# Patient Record
Sex: FEMALE | Race: BLACK OR AFRICAN AMERICAN | URBAN - METROPOLITAN AREA
[De-identification: names, ages, dates, MRNs, and addresses within clinical notes are randomized per-mention and may not be internally consistent; named-entity substitution may affect disease eponyms.]

---

## 2021-05-02 ENCOUNTER — HOSPITAL ENCOUNTER (INPATIENT)
Age: 31
LOS: 3 days | Discharge: HOME | DRG: 754 | End: 2021-05-06
Attending: EMERGENCY MEDICINE | Admitting: PSYCHIATRY & NEUROLOGY
Payer: MEDICAID

## 2021-05-02 DIAGNOSIS — N39.0 URINARY TRACT INFECTION WITHOUT HEMATURIA, SITE UNSPECIFIED: ICD-10-CM

## 2021-05-02 DIAGNOSIS — F14.10 COCAINE USE DISORDER (HCC): ICD-10-CM

## 2021-05-02 DIAGNOSIS — F43.21 ADJUSTMENT DISORDER WITH DEPRESSED MOOD: ICD-10-CM

## 2021-05-02 DIAGNOSIS — F11.10: ICD-10-CM

## 2021-05-02 DIAGNOSIS — F19.10 POLYSUBSTANCE ABUSE (HCC): ICD-10-CM

## 2021-05-02 DIAGNOSIS — R45.851 SUICIDAL IDEATION: Primary | ICD-10-CM

## 2021-05-02 DIAGNOSIS — Z91.89 AT HIGH RISK FOR SELF HARM: ICD-10-CM

## 2021-05-02 LAB
ALBUMIN SERPL-MCNC: 3.3 G/DL (ref 3.5–5)
ALBUMIN/GLOB SERPL: 0.9 {RATIO} (ref 1.1–2.2)
ALP SERPL-CCNC: 87 U/L (ref 45–117)
ALT SERPL-CCNC: 33 U/L (ref 12–78)
AMPHET UR QL SCN: NEGATIVE
ANION GAP SERPL CALC-SCNC: 6 MMOL/L (ref 5–15)
APAP SERPL-MCNC: <2 UG/ML (ref 10–30)
APPEARANCE UR: CLEAR
AST SERPL-CCNC: 16 U/L (ref 15–37)
BACTERIA URNS QL MICRO: ABNORMAL /HPF
BARBITURATES UR QL SCN: NEGATIVE
BASOPHILS # BLD: 0.1 K/UL (ref 0–0.1)
BASOPHILS NFR BLD: 1 % (ref 0–1)
BENZODIAZ UR QL: NEGATIVE
BILIRUB SERPL-MCNC: 0.1 MG/DL (ref 0.2–1)
BILIRUB UR QL: NEGATIVE
BUN SERPL-MCNC: 15 MG/DL (ref 6–20)
BUN/CREAT SERPL: 19 (ref 12–20)
CALCIUM SERPL-MCNC: 8.4 MG/DL (ref 8.5–10.1)
CANNABINOIDS UR QL SCN: NEGATIVE
CHLORIDE SERPL-SCNC: 99 MMOL/L (ref 97–108)
CO2 SERPL-SCNC: 28 MMOL/L (ref 21–32)
COCAINE UR QL SCN: NEGATIVE
COLOR UR: ABNORMAL
CREAT SERPL-MCNC: 0.8 MG/DL (ref 0.55–1.02)
DIFFERENTIAL METHOD BLD: ABNORMAL
DRUG SCRN COMMENT,DRGCM: NORMAL
EOSINOPHIL # BLD: 0.4 K/UL (ref 0–0.4)
EOSINOPHIL NFR BLD: 4 % (ref 0–7)
EPITH CASTS URNS QL MICRO: ABNORMAL /LPF
ERYTHROCYTE [DISTWIDTH] IN BLOOD BY AUTOMATED COUNT: 11.9 % (ref 11.5–14.5)
ETHANOL SERPL-MCNC: <10 MG/DL
FLUAV RNA SPEC QL NAA+PROBE: NOT DETECTED
FLUBV RNA SPEC QL NAA+PROBE: NOT DETECTED
GLOBULIN SER CALC-MCNC: 3.7 G/DL (ref 2–4)
GLUCOSE SERPL-MCNC: 171 MG/DL (ref 65–100)
GLUCOSE UR STRIP.AUTO-MCNC: NEGATIVE MG/DL
HCG SERPL QL: NEGATIVE
HCG UR QL: NEGATIVE
HCT VFR BLD AUTO: 42.4 % (ref 35–47)
HGB BLD-MCNC: 14.4 G/DL (ref 11.5–16)
HGB UR QL STRIP: NEGATIVE
IMM GRANULOCYTES # BLD AUTO: 0 K/UL (ref 0–0.04)
IMM GRANULOCYTES NFR BLD AUTO: 0 % (ref 0–0.5)
KETONES UR QL STRIP.AUTO: NEGATIVE MG/DL
LEUKOCYTE ESTERASE UR QL STRIP.AUTO: ABNORMAL
LYMPHOCYTES # BLD: 3.8 K/UL (ref 0.8–3.5)
LYMPHOCYTES NFR BLD: 40 % (ref 12–49)
MCH RBC QN AUTO: 31.9 PG (ref 26–34)
MCHC RBC AUTO-ENTMCNC: 34 G/DL (ref 30–36.5)
MCV RBC AUTO: 93.8 FL (ref 80–99)
METHADONE UR QL: NEGATIVE
MONOCYTES # BLD: 0.7 K/UL (ref 0–1)
MONOCYTES NFR BLD: 7 % (ref 5–13)
NEUTS SEG # BLD: 4.5 K/UL (ref 1.8–8)
NEUTS SEG NFR BLD: 48 % (ref 32–75)
NITRITE UR QL STRIP.AUTO: NEGATIVE
NRBC # BLD: 0 K/UL (ref 0–0.01)
NRBC BLD-RTO: 0 PER 100 WBC
OPIATES UR QL: NEGATIVE
PCP UR QL: NEGATIVE
PH UR STRIP: 7.5 [PH] (ref 5–8)
PLATELET # BLD AUTO: 275 K/UL (ref 150–400)
PMV BLD AUTO: 9.9 FL (ref 8.9–12.9)
POTASSIUM SERPL-SCNC: 3.7 MMOL/L (ref 3.5–5.1)
PROT SERPL-MCNC: 7 G/DL (ref 6.4–8.2)
PROT UR STRIP-MCNC: NEGATIVE MG/DL
RBC # BLD AUTO: 4.52 M/UL (ref 3.8–5.2)
RBC #/AREA URNS HPF: ABNORMAL /HPF (ref 0–5)
SALICYLATES SERPL-MCNC: <1.7 MG/DL (ref 2.8–20)
SARS-COV-2, COV2: NOT DETECTED
SODIUM SERPL-SCNC: 133 MMOL/L (ref 136–145)
SP GR UR REFRACTOMETRY: 1 (ref 1–1.03)
UA: UC IF INDICATED,UAUC: ABNORMAL
UROBILINOGEN UR QL STRIP.AUTO: 1 EU/DL (ref 0.2–1)
WBC # BLD AUTO: 9.5 K/UL (ref 3.6–11)
WBC URNS QL MICRO: ABNORMAL /HPF (ref 0–4)

## 2021-05-02 PROCEDURE — 84703 CHORIONIC GONADOTROPIN ASSAY: CPT

## 2021-05-02 PROCEDURE — 81025 URINE PREGNANCY TEST: CPT

## 2021-05-02 PROCEDURE — 90791 PSYCH DIAGNOSTIC EVALUATION: CPT

## 2021-05-02 PROCEDURE — 81001 URINALYSIS AUTO W/SCOPE: CPT

## 2021-05-02 PROCEDURE — 80143 DRUG ASSAY ACETAMINOPHEN: CPT

## 2021-05-02 PROCEDURE — 93005 ELECTROCARDIOGRAM TRACING: CPT

## 2021-05-02 PROCEDURE — 80179 DRUG ASSAY SALICYLATE: CPT

## 2021-05-02 PROCEDURE — 80053 COMPREHEN METABOLIC PANEL: CPT

## 2021-05-02 PROCEDURE — 99285 EMERGENCY DEPT VISIT HI MDM: CPT

## 2021-05-02 PROCEDURE — 80307 DRUG TEST PRSMV CHEM ANLYZR: CPT

## 2021-05-02 PROCEDURE — 36415 COLL VENOUS BLD VENIPUNCTURE: CPT

## 2021-05-02 PROCEDURE — 85025 COMPLETE CBC W/AUTO DIFF WBC: CPT

## 2021-05-02 PROCEDURE — 82077 ASSAY SPEC XCP UR&BREATH IA: CPT

## 2021-05-02 PROCEDURE — 87636 SARSCOV2 & INF A&B AMP PRB: CPT

## 2021-05-02 NOTE — ED TRIAGE NOTES
Pt comes in with family (sister who drove all the way from [place] and aunt) reporting SI for a while and getting worse. Pt says she smoke crack and sniffs heroin and that she plans to start injecting and overdose. Pt denies HI. Pt says she is hearing voice saying, \"Do it! Do it! \"

## 2021-05-03 PROBLEM — F19.94 SUBSTANCE INDUCED MOOD DISORDER (HCC): Status: ACTIVE | Noted: 2021-05-03

## 2021-05-03 LAB
ATRIAL RATE: 66 BPM
CALCULATED P AXIS, ECG09: 67 DEGREES
CALCULATED R AXIS, ECG10: 70 DEGREES
CALCULATED T AXIS, ECG11: 44 DEGREES
DIAGNOSIS, 93000: NORMAL
P-R INTERVAL, ECG05: 184 MS
Q-T INTERVAL, ECG07: 410 MS
QRS DURATION, ECG06: 106 MS
QTC CALCULATION (BEZET), ECG08: 429 MS
VENTRICULAR RATE, ECG03: 66 BPM

## 2021-05-03 PROCEDURE — 65220000003 HC RM SEMIPRIVATE PSYCH

## 2021-05-03 PROCEDURE — 74011250637 HC RX REV CODE- 250/637: Performed by: EMERGENCY MEDICINE

## 2021-05-03 RX ORDER — BENZTROPINE MESYLATE 1 MG/1
1 TABLET ORAL
Status: DISCONTINUED | OUTPATIENT
Start: 2021-05-03 | End: 2021-05-06 | Stop reason: HOSPADM

## 2021-05-03 RX ORDER — ACETAMINOPHEN 325 MG/1
650 TABLET ORAL
Status: DISCONTINUED | OUTPATIENT
Start: 2021-05-03 | End: 2021-05-06 | Stop reason: HOSPADM

## 2021-05-03 RX ORDER — CLONIDINE HYDROCHLORIDE 0.1 MG/1
0.1 TABLET ORAL
Status: DISCONTINUED | OUTPATIENT
Start: 2021-05-03 | End: 2021-05-06 | Stop reason: HOSPADM

## 2021-05-03 RX ORDER — OLANZAPINE 5 MG/1
5 TABLET ORAL
Status: DISCONTINUED | OUTPATIENT
Start: 2021-05-03 | End: 2021-05-06 | Stop reason: HOSPADM

## 2021-05-03 RX ORDER — TRAZODONE HYDROCHLORIDE 50 MG/1
50 TABLET ORAL
Status: DISCONTINUED | OUTPATIENT
Start: 2021-05-03 | End: 2021-05-06 | Stop reason: HOSPADM

## 2021-05-03 RX ORDER — HYDROXYZINE 25 MG/1
50 TABLET, FILM COATED ORAL
Status: DISCONTINUED | OUTPATIENT
Start: 2021-05-03 | End: 2021-05-06 | Stop reason: HOSPADM

## 2021-05-03 RX ORDER — DIPHENHYDRAMINE HYDROCHLORIDE 50 MG/ML
50 INJECTION, SOLUTION INTRAMUSCULAR; INTRAVENOUS
Status: DISCONTINUED | OUTPATIENT
Start: 2021-05-03 | End: 2021-05-06 | Stop reason: HOSPADM

## 2021-05-03 RX ORDER — CEPHALEXIN 500 MG/1
500 CAPSULE ORAL
Status: COMPLETED | OUTPATIENT
Start: 2021-05-03 | End: 2021-05-03

## 2021-05-03 RX ORDER — CEPHALEXIN 500 MG/1
500 CAPSULE ORAL 4 TIMES DAILY
Status: DISCONTINUED | OUTPATIENT
Start: 2021-05-03 | End: 2021-05-03

## 2021-05-03 RX ORDER — LORAZEPAM 2 MG/ML
1 INJECTION INTRAMUSCULAR
Status: DISCONTINUED | OUTPATIENT
Start: 2021-05-03 | End: 2021-05-06 | Stop reason: HOSPADM

## 2021-05-03 RX ORDER — HALOPERIDOL 5 MG/ML
5 INJECTION INTRAMUSCULAR
Status: DISCONTINUED | OUTPATIENT
Start: 2021-05-03 | End: 2021-05-06 | Stop reason: HOSPADM

## 2021-05-03 RX ORDER — ADHESIVE BANDAGE
30 BANDAGE TOPICAL DAILY PRN
Status: DISCONTINUED | OUTPATIENT
Start: 2021-05-03 | End: 2021-05-06 | Stop reason: HOSPADM

## 2021-05-03 RX ADMIN — CEPHALEXIN 500 MG: 500 CAPSULE ORAL at 07:02

## 2021-05-03 RX ADMIN — CEPHALEXIN 500 MG: 500 CAPSULE ORAL at 00:38

## 2021-05-03 RX ADMIN — CEPHALEXIN 500 MG: 500 CAPSULE ORAL at 13:11

## 2021-05-03 RX ADMIN — CEPHALEXIN 500 MG: 500 CAPSULE ORAL at 09:56

## 2021-05-03 NOTE — ED NOTES
Pt left ED via wheelchair accompanied by security en route to [facility] room [facility]. Pt in no acute distress and verbalizes understanding of plan of care. Security given 1 pt belonging bag containing pt's belongings.

## 2021-05-03 NOTE — ED NOTES
Spoke with Maribel Lin from [facility] who reports that there are currently no behavioral health beds available; pt will be held over night. If pt decides to leave, a TDO may be necessary.

## 2021-05-03 NOTE — GROUP NOTE
EDBORAH  GROUP DOCUMENTATION INDIVIDUAL                                                                          Group Therapy Note    Date: 5/3/2021    Group Start Time: 1100  Group End Time: 1200  Group Topic: Topic Group    [facility] [place] [facility] ACUTE BEHAV [facility] [place]    [facility] [place] GROUP    Group Therapy Note    Attendees: 4         Attendance: Did not attend    Patient's Goal:      Interventions/techniques  Ric Loyola

## 2021-05-03 NOTE — ED NOTES
Ruben Crenshaw from [facility] reports that wer are waiting on bed within [facility] to become available for patient.

## 2021-05-03 NOTE — BSMART NOTE
Comprehensive Assessment Form Part 1    Section I - Disposition    Axis I - Substance Induced Mood Disorder, Cocaine Dependence, Opioid Dependence   Axis II - Deferred  Axis III - None  Axis IV - Relationship stressors, Lack of support system, Lack of structure  [place] V - 35      The Medical Doctor to Psychiatrist conference was not completed. The Medical Doctor is in agreement with Psychiatrist disposition because of (reason) patient is willing to be admitted voluntarily. The plan is admit to appropriate local psychiatric bed. Should patient change her mind while waiting for placement, a TDO evaluation should be requested from [facility]. The on-call Psychiatrist consulted was Dr. Hue Mar. The admitting Psychiatrist will be Dr. Hue Mar. The admitting Diagnosis is Substance Induced Mood Disorder. The Payor source is [employer]. Section II - Integrated Summary  Summary:  Patient is a 28year old female seen face to face in the ER. She was brought to the ER by her sister and aunt for suicidal ideation with a plan to overdose by injecting fentanyl. She reported she has been using crack cocaine for 9 years and became snorting heroin in this last year. She reported she is tired of using but feels like she can't control it. She is had not been sleeping. She reported she has had suicidal thoughts for a while but they have been getting worse. She reported some homicidal ideations towards her roommates with no plan. She denied generalized homicidal ideation. She reported she is hearing voices telling her to kill herself, saying \"do it, do it. \"  She said she has been hospitalized once before for suicidal thoughts, but she does not remember when this took place. She is not receiving any mental health or substance abuse treatment currently. She reported she knows she needs help and wants to be admitted psychiatrically. The patient has demonstrated mental capacity to provide informed consent.   The information is given by the patient and aunt. The Chief Complaint is suicidal.  The Precipitant Factors are relationship stressors, lack of support system, lack of structure, chronic substance abuse. Previous Hospitalizations: yes  The patient has not previously been in restraints. Current Psychiatrist and/or  is NA. Lethality Assessment:    The potential for suicide is noted by the following: defined plan, ideation and current substance abuse. The potential for homicide is not noted. The patient has not been a perpetrator of sexual or physical abuse. There are not pending charges. The patient is felt to be at risk for self harm or harm to others. The attending nurse was advised the patient needs supervision. Section III - Psychosocial  The patient's overall mood and attitude is withdrawn. Feelings of helplessness and hopelessness are observed by patient's self report. Generalized anxiety is not observed. Panic is not observed. Phobias are not observed. Obsessive compulsive tendencies are not observed. Section IV - Mental Status Exam  The patient's appearance shows no evidence of impairment. The patient's behavior shows no evidence of impairment. The patient is oriented to time, place, person and situation. The patient's speech is soft. The patient's mood is withdrawn. The range of affect is flat. The patient's thought content demonstrates no evidence of impairment. The thought process shows no evidence of impairment. The patient's perception demonstrated changes in the following:  auditory hallucinations. The patient's memory shows no evidence of impairment. The patient's appetite shows no evidence of impairment. The patient's sleep has evidence of insomnia. The patient's insight is blaming. The patient's judgement is psychologically impaired. Section V - Substance Abuse  The patient is using substances.   The patient is using tobacco by smoking for greater than 10 years with last use on today, alcohol for 5-10 years with last use on unknown, cannabis by smoking for 5-10 years with last use on last week, cocaine by inhalation for 5-10 years with last use on yesterday and heroin by inhalation for 1-5 years with last use on yesterday. The patient has experienced the following withdrawal symptoms: sweats, cravings and sleep disturbance. Section VI - Living Arrangements  The patient is single. The patient lives with roommates. The patient has no children. The patient does plan to return home upon discharge. The patient does not have legal issues pending. The patient's source of income comes from unknown. Anabaptist and cultural practices have not been voiced at this time. The patient's greatest support comes from her aunt and this person will be involved with the treatment. The patient has been in an event described as horrible or outside the realm of ordinary life experience either currently or in the past.  The patient has not been a victim of sexual/physical abuse. Section VII - Other Areas of Clinical Concern  The highest grade achieved is not assessed with the overall quality of school experience being described as unknown. The patient is currently unemployed and speaks [place] as a primary language. The patient has no communication impairments affecting communication. The patient's preference for learning can be described as: can read and write adequately.   The patient's hearing is normal.  The patient's vision is normal.      Heaven Fraire MA

## 2021-05-03 NOTE — BH NOTES
GROUP THERAPY PROGRESS NOTE    Patient did not participate in Coping Skills Group.     Rachelle Gr MSW

## 2021-05-03 NOTE — GROUP NOTE
DEBORAH  GROUP DOCUMENTATION INDIVIDUAL                                                                          Group Therapy Note    Date: 5/3/2021    Group Start Time: 1500  Group End Time: 1600  Group Topic: Recreational/Music Therapy    [facility] [place] [address] ACUTE BEHAV [facility]    [place] [facility] GROUP DOCUMENTATION GROUP    Group Therapy Note    Attendees: 6         Attendance: Did not attend    Patient's Goal:      Interventions/techniquesGladis Pickens

## 2021-05-03 NOTE — ED NOTES
Verbal shift change report given to Arely Rosario RN (oncoming nurse) by Shakira Campbell RN (offgoing nurse). Report included the following information SBAR, Kardex, ED Summary, Procedure Summary, MAR and Recent Results.

## 2021-05-03 NOTE — PROGRESS NOTES
Behavioral Services  Medicare Certification Upon Admission    I certify that this patient's inpatient psychiatric hospital admission is medically necessary for:    [x] (1) Treatment which could reasonably be expected to improve this patient's condition,       [x] (2) Or for diagnostic study;     AND     [x](2) The inpatient psychiatric services are provided while the individual is under the care of a physician and are included in the individualized plan of care.     Estimated length of stay/service 3-5 days    Plan for post-hospital care rehab    Electronically signed by Juancarlos Diaz MD on 5/3/2021 at 1:11 PM

## 2021-05-03 NOTE — ED NOTES
Pts asleep in bed at this time. Lights turned off and door closed w/ curtains moved back so pt is within site. . Will continue to monitor    Assumed patient care as the role of preceptor to Destiney Roberts RN.

## 2021-05-03 NOTE — ED NOTES
TRANSFER - OUT REPORT:    Verbal report given to SHERITA Jhaveri(name) on Shant Resendiz  being transferred to [facility] (unit) room 314 for routine progression of care       Report consisted of patients Situation, Background, Assessment and   Recommendations(SBAR). Information from the following report(s) SBAR, Kardex, ED Summary, Procedure Summary, MAR and Recent Results was reviewed with the receiving nurse. Lines:       Opportunity for questions and clarification was provided.       Patient transported with:   [employer]

## 2021-05-03 NOTE — ED PROVIDER NOTES
EMERGENCY DEPARTMENT HISTORY AND PHYSICAL EXAM      Please note that this dictation was completed with the assistance of \"Dragon\", the computer voice recognition software. Quite often unanticipated grammatical, syntax, homophones, and other interpretive errors are inadvertently transcribed by the computer software. Please disregard these errors and any errors that have escaped final proofreading. Thank you. Patient Name: Aris Long  : 1990  MRN: [id number]  History of Presenting Illness     Chief Complaint   Patient presents with    Suicidal     History Provided By: Patient and sister    HPI: Aris Long, 28 y.o. female with past medical history as documented below presents to the ED with c/o of suicide attempt and suicide thoughts. According to patient and patient's sister, patient has been expressing increasing suicide thoughts and depressive symptoms for the past week or so. Patient states that earlier today she was doing a bunch of drugs including cocaine and heroin in attempt to end her life. She called her sister who stated that the patient voiced that she wanted some fentanyl to end it all. Patient voiced previous admissions about 2 months ago and another institution for admission for psychiatric stabilization. Patient states that in the past she tried to cut her wrist and attempt to end her life. She currently takes no medications for her psychiatric illnesses. She reports a history of depression. She denies any medical complaints currently. She denies any sick contacts or exposure to COVID-19. She denies any recent alcohol use. She denies any homicidal ideations. She does voice having auditory hallucinations and states that the voices tell her to end her life. Denies any visual hallucinations. Pt denies any other alleviating or exacerbating factors.  Additionally, pt specifically denies any recent fever, chills, headache, nausea, vomiting, abdominal pain, CP, SOB, lightheadedness, dizziness, numbness, weakness, lower extremity swelling, heart palpitations, urinary sxs, diarrhea, constipation, melena, hematochezia, cough, or congestion. There are no other complaints, changes or physical findings pertinent to the HPI at this time. PCP: None    Past History   Past Medical History:  Depression  Suicide attempts    Past Surgical History:  Denies    Family History:  Denies    Social History:  Endorses tobacco use, occasional ETOH, endorses cocaine and heroin use    Allergies: Allergies   Allergen Reactions    Seafood Swelling       Current Medications:  No current facility-administered medications on file prior to encounter. No current outpatient medications on file prior to encounter. Review of Systems   Review of Systems   Constitutional: Negative. Negative for chills and fever. HENT: Negative. Negative for congestion and sore throat. Eyes: Negative. Respiratory: Negative. Negative for cough, chest tightness, shortness of breath and wheezing. Cardiovascular: Negative. Negative for chest pain, palpitations and leg swelling. Gastrointestinal: Negative. Negative for abdominal distention, abdominal pain, blood in stool, constipation, diarrhea, nausea and vomiting. Endocrine: Negative. Genitourinary: Negative. Negative for dysuria, flank pain, frequency, hematuria and urgency. Musculoskeletal: Negative. Negative for arthralgias, back pain and myalgias. Skin: Negative. Negative for color change and rash. Neurological: Negative. Negative for dizziness, syncope, speech difficulty, weakness, light-headedness, numbness and headaches. Hematological: Negative. Psychiatric/Behavioral: Positive for agitation, behavioral problems, decreased concentration, dysphoric mood, hallucinations, self-injury, sleep disturbance and suicidal ideas. Negative for confusion. The patient is not nervous/anxious.     All other systems reviewed and are negative. Physical Exam   Physical Exam  Vitals signs and nursing note reviewed. Constitutional:       General: She is not in acute distress. Appearance: She is well-developed. She is not diaphoretic. HENT:      Head: Normocephalic and atraumatic. Mouth/Throat:      Pharynx: No oropharyngeal exudate. Eyes:      Conjunctiva/sclera: Conjunctivae normal.   Neck:      Musculoskeletal: Normal range of motion. Cardiovascular:      Rate and Rhythm: Normal rate and regular rhythm. Heart sounds: Normal heart sounds. Pulmonary:      Effort: Pulmonary effort is normal. No respiratory distress. Breath sounds: Normal breath sounds. No wheezing or rales. Chest:      Chest wall: No tenderness. Abdominal:      General: Bowel sounds are normal. There is no distension. Palpations: Abdomen is soft. There is no mass. Tenderness: There is no abdominal tenderness. There is no guarding or rebound. Musculoskeletal: Normal range of motion. Skin:     General: Skin is warm. Neurological:      Mental Status: She is alert and oriented to person, place, and time. Cranial Nerves: No cranial nerve deficit. Motor: No abnormal muscle tone. Psychiatric:         Attention and Perception: She is attentive. She perceives auditory hallucinations. She does not perceive visual hallucinations. Mood and Affect: Mood is depressed. Affect is flat and tearful. Speech: Speech is delayed. Behavior: Behavior is slowed and withdrawn. Thought Content: Thought content is not paranoid or delusional. Thought content includes suicidal ideation. Thought content does not include homicidal ideation. Thought content includes suicidal plan. Thought content does not include homicidal plan. Judgment: Judgment is impulsive. Diagnostic Study Results     Labs -   I have personally reviewed and interpreted all available laboratory results.    Recent Results (from the past 24 hour(s))   CBC WITH AUTOMATED DIFF    Collection Time: 05/02/21  8:47 PM   Result Value Ref Range    WBC 9.5 3.6 - 11.0 K/uL    RBC 4.52 3.80 - 5.20 M/uL    HGB 14.4 11.5 - 16.0 g/dL    HCT 42.4 35.0 - 47.0 %    MCV 93.8 80.0 - 99.0 FL    MCH 31.9 26.0 - 34.0 PG    MCHC 34.0 30.0 - 36.5 g/dL    RDW 11.9 11.5 - 14.5 %    PLATELET 203 234 - 033 K/uL    MPV 9.9 8.9 - 12.9 FL    NRBC 0.0 0  WBC    ABSOLUTE NRBC 0.00 0.00 - 0.01 K/uL    NEUTROPHILS 48 32 - 75 %    LYMPHOCYTES 40 12 - 49 %    MONOCYTES 7 5 - 13 %    EOSINOPHILS 4 0 - 7 %    BASOPHILS 1 0 - 1 %    IMMATURE GRANULOCYTES 0 0.0 - 0.5 %    ABS. NEUTROPHILS 4.5 1.8 - 8.0 K/UL    ABS. LYMPHOCYTES 3.8 (H) 0.8 - 3.5 K/UL    ABS. MONOCYTES 0.7 0.0 - 1.0 K/UL    ABS. EOSINOPHILS 0.4 0.0 - 0.4 K/UL    ABS. BASOPHILS 0.1 0.0 - 0.1 K/UL    ABS. IMM. GRANS. 0.0 0.00 - 0.04 K/UL    DF AUTOMATED     METABOLIC PANEL, COMPREHENSIVE    Collection Time: 05/02/21  8:47 PM   Result Value Ref Range    Sodium 133 (L) 136 - 145 mmol/L    Potassium 3.7 3.5 - 5.1 mmol/L    Chloride 99 97 - 108 mmol/L    CO2 28 21 - 32 mmol/L    Anion gap 6 5 - 15 mmol/L    Glucose 171 (H) 65 - 100 mg/dL    BUN 15 6 - 20 MG/DL    Creatinine 0.80 0.55 - 1.02 MG/DL    BUN/Creatinine ratio 19 12 - 20      GFR est AA >60 >60 ml/min/1.73m2    GFR est non-AA >60 >60 ml/min/1.73m2    Calcium 8.4 (L) 8.5 - 10.1 MG/DL    Bilirubin, total 0.1 (L) 0.2 - 1.0 MG/DL    ALT (SGPT) 33 12 - 78 U/L    AST (SGOT) 16 15 - 37 U/L    Alk.  phosphatase 87 45 - 117 U/L    Protein, total 7.0 6.4 - 8.2 g/dL    Albumin 3.3 (L) 3.5 - 5.0 g/dL    Globulin 3.7 2.0 - 4.0 g/dL    A-G Ratio 0.9 (L) 1.1 - 2.2     ETHYL ALCOHOL    Collection Time: 05/02/21  8:47 PM   Result Value Ref Range    ALCOHOL(ETHYL),SERUM <44 <01 MG/DL   SALICYLATE    Collection Time: 05/02/21  8:47 PM   Result Value Ref Range    Salicylate level <7.0 (L) 2.8 - 20.0 MG/DL   ACETAMINOPHEN    Collection Time: 05/02/21  8:47 PM   Result Value Ref Range    Acetaminophen level <2 (L) 10 - 30 ug/mL   HCG QL SERUM    Collection Time: 05/02/21  8:47 PM   Result Value Ref Range    HCG, Ql. Negative NEG     COVID-19 WITH INFLUENZA A/B    Collection Time: 05/02/21  8:50 PM   Result Value Ref Range    SARS-CoV-2 Not detected NOTD      Influenza A by PCR Not detected NOTD      Influenza B by PCR Not detected NOTD     EKG, 12 LEAD, INITIAL    Collection Time: 05/02/21  9:42 PM   Result Value Ref Range    Ventricular Rate 66 BPM    Atrial Rate 66 BPM    P-R Interval 184 ms    QRS Duration 106 ms    Q-T Interval 410 ms    QTC Calculation (Bezet) 429 ms    Calculated P Axis 67 degrees    Calculated R Axis 70 degrees    Calculated T Axis 44 degrees    Diagnosis       Normal sinus rhythm with sinus arrhythmia  Normal ECG  No previous ECGs available     DRUG SCREEN, URINE    Collection Time: 05/02/21 10:46 PM   Result Value Ref Range    AMPHETAMINES Negative NEG      BARBITURATES Negative NEG      BENZODIAZEPINES Negative NEG      COCAINE Negative NEG      METHADONE Negative NEG      OPIATES Negative NEG      PCP(PHENCYCLIDINE) Negative NEG      THC (TH-CANNABINOL) Negative NEG      Drug screen comment (NOTE)    URINALYSIS W/ REFLEX CULTURE    Collection Time: 05/02/21 10:46 PM    Specimen: Urine   Result Value Ref Range    Color YELLOW/STRAW      Appearance CLEAR CLEAR      Specific gravity 1.005 1.003 - 1.030      pH (UA) 7.5 5.0 - 8.0      Protein Negative NEG mg/dL    Glucose Negative NEG mg/dL    Ketone Negative NEG mg/dL    Bilirubin Negative NEG      Blood Negative NEG      Urobilinogen 1.0 0.2 - 1.0 EU/dL    Nitrites Negative NEG      Leukocyte Esterase SMALL (A) NEG      WBC 5-10 0 - 4 /hpf    RBC 0-5 0 - 5 /hpf    Epithelial cells FEW FEW /lpf    Bacteria 1+ (A) NEG /hpf    UA:UC IF INDICATED CULTURE NOT INDICATED BY UA RESULT CNI     HCG URINE, QL. - POC    Collection Time: 05/02/21 10:59 PM   Result Value Ref Range    Pregnancy test,urine (POC) Negative NEG Radiologic Studies -   I have personally reviewed and interpreted all available imaging studies and agree with radiology interpretation and report. No orders to display     CT Results  (Last 48 hours)    None        CXR Results  (Last 48 hours)    None          Medical Decision Making   I reviewed the vital signs, available nursing notes, past medical history, past surgical history, family history and social history. Vital Signs-Reviewed the patient's vital signs. Patient Vitals for the past 24 hrs:   Temp Pulse Resp BP SpO2   05/03/21 0302 97.7 °F (36.5 °C) 93 16 117/70 99 %   05/02/21 1957 98.1 °F (36.7 °C) 90 20 124/85 99 %       Pulse Oximetry Analysis - 99% on RA    Cardiac Monitor:   Rate: 90 bpm  The cardiac monitor revealed the following rhythm as interpreted by me: Normal Sinus Rhythm       Records Reviewed: Nursing Notes, Old Medical Records, Previous electrocardiograms, Previous Radiology Studies and Previous Laboratory Studies    Provider Notes (Medical Decision Making):   Patient presents with acute suicidal ideation. DDx:  2/2 MDD, schizoaffective d/o, bipolar, drug induced, organic cause such as electrolyte anomoly or infection. Stable vitals and benign exam. No obvious organic causes to explain behavior but will obtain psych labs, UA, UDS and speak with mental health professional about possible admission. Pt is currently voluntary. Sitter at bedside. Will continue to monitor while in ED. ED Course:   I am the first provider for this patient's ED visit today. Initial assessment performed. I discussed presenting problems, concerns and my formulated plan for today's visit with the patient and any available family members at bedside. I encouraged them to ask questions as they arise throughout the visit. TOBACCO COUNSELING:  Upon evaluation, pt expressed that they are a current tobacco user.  For approximately 5 mins, pt has been counseled on the dangers of smoking and was encouraged to quit as soon as possible in order to decrease further risks to their health. Pt has conveyed their understanding of the risks involved should they continue to use tobacco products. Drug Abuse Cessation: Assessment and Intervention  Patient was assessed for drug abuse and addiction using the DAST-10 screening tool and determined to be moderate risk. Discussed the risks of drug abuse and the long term sequelae of cocaine and heroin use with the patient such as increased risk of depression, respiratory failure, heart attack, stroke, and death. Patient was offered follow-up resources on local rehabilitation facilities. Patient declined the resources. The patient verbalized their understanding. . Counseled patient for approximately 17 minutes (between 15-30 minutes). I reviewed our electronic medical record system for any past medical records that were available that may contribute to the patient's current condition, the nursing notes and vital signs from today's visit.       ED Orders Placed :  Orders Placed This Encounter    CBC WITH AUTOMATED DIFF    METABOLIC PANEL, COMPREHENSIVE    BLOOD ALCOHOL (Ethyl Alcohol)    SALICYLATE    ACETAMINOPHEN    DRUG SCREEN, URINE    URINALYSIS W/ REFLEX CULTURE    HCG QL SERUM    COVID-19 WITH INFLUENZA A/B    DIET REGULAR    POC URINE PREGNANCY TEST    ENCOURAGE PO FLUIDS    HCG URINE, QL. - POC    EKG 12 LEAD INITIAL    cephALEXin (KEFLEX) capsule 500 mg    IP CONSULT TO BSMART       ED Medications Administered:  Medications   OLANZapine (ZyPREXA) tablet 5 mg (has no administration in time range)   haloperidol lactate (HALDOL) injection 5 mg (has no administration in time range)   benztropine (COGENTIN) tablet 1 mg (has no administration in time range)   diphenhydrAMINE (BENADRYL) injection 50 mg (has no administration in time range)   hydrOXYzine HCL (ATARAX) tablet 50 mg (has no administration in time range)   LORazepam (ATIVAN) injection 1 mg (has no administration in time range)   traZODone (DESYREL) tablet 50 mg (has no administration in time range)   acetaminophen (TYLENOL) tablet 650 mg (has no administration in time range)   magnesium hydroxide (MILK OF MAGNESIA) 400 mg/5 mL oral suspension 30 mL (has no administration in time range)   cloNIDine HCL (CATAPRES) tablet 0.1 mg (has no administration in time range)   cephALEXin (KEFLEX) capsule 500 mg (500 mg Oral Given 5/3/21 0038)     ED Course as of May 04 0242   Mon May 03, 2021   0950 Patient has been admitted to inpatient psychiatry here at [facility].    [MS]      ED Course User Index  [MS] Carol Davila MD      Consult Note:  Shanel Nuñez MD spoke with [facility],   Specialty: [facility]  Discussed pt's hx, disposition, and available diagnostic and imaging results. Reviewed care plans. Agree with management and plan thus far. Consultant will evaluate pt for admission. Progress Note:  Pt is medically cleared. Awaiting BSMART disposition. Disposition:   ADMIT  Given the patient's current clinical presentation, I have a high level of concern for decompensation if discharged from the emergency department. Patient is being admitted to the hospital.  The results of their tests and reasons for their admission have been discussed with them and/or available family. They convey agreement and understanding for the need to be admitted and for their admission diagnosis. Consultation has been made with the inpatient physician specialist for hospitalization. Diagnosis   Clinical Impression:  1. Suicidal ideation    2. Polysubstance abuse ([address]    3. At high risk for self harm    4. Urinary tract infection without hematuria, site unspecified        Attestation:  Dominique Machado MD, am the attending of record for this patient. I personally performed the services described in this documentation on this date, 5/2/2021 for patient, Fadia Mayo.  I have reviewed the chart and verified that the record is accurate and complete.

## 2021-05-03 NOTE — ED NOTES
Pt arrives with Mom with c/o SI for \"a while\" but has recently gotten worse. Pt reports SI, reports thoughts of harming people who live in her home, reports auditory hallucinations of voices saying, \"just kill yourself. Just do it. You're worthless. \" Pt reports her plan for harming herself is to shoot up with fentanyl. Pt denies visual hallucinations. Pt reports daily use of crack cocaine and heroin, and frequent use of ETOH. Pt and mom both endorse no substance use or ETOH use today. Pt reports she did both crack and heroin yesterday. Pt mom reports pt was \"missing\" for 6 years, pt mom spent 6 years trying to locate her daughter and states she has called [employer] continuously to inquire about any sightings of her daughter and finally located her for the first time last month at a shelter in [address] [place] [address]. Alert and oriented x 4. Skin warm, dry, intact. Ambulates independently. Mother at bedside. Plan of care discussed. Emergency Department Nursing Plan of Care       The Nursing Plan of Care is developed from the Nursing assessment and Emergency Department Attending provider initial evaluation. The plan of care may be reviewed in the ED Provider note.     The Plan of Care was developed with the following considerations:   Patient / Family readiness to learn indicated by:verbalized understanding  Persons(s) to be included in education: patient  Barriers to Learning/Limitations:No    Signed     Tarah Ward RN    5/2/2021   8:28 PM

## 2021-05-03 NOTE — GROUP NOTE
[facility] GROUP DOCUMENTATION INDIVIDUAL                                                                          Group Therapy Note    Date: 5/3/2021    Group Start Time: 0900  Group End Time: 1000  Group Topic: Topic Group    [facility] [place] [address] ACUTE BEHAV [facility]    [address] GROUP    Group Therapy Note    Attendees: 5         Attendance: Did not attend    Patient's Goal:      Interventions/techniques:  Stefany Nuñez

## 2021-05-03 NOTE — BSMART NOTE
Patient has been accepted to Bed 314/2 at [facility] by Dr Marissa Williamson. Report can be called to [phone].

## 2021-05-03 NOTE — ED NOTES
Bedside and Verbal shift change report given to Cian Valentin RN (oncoming nurse) by Isadora Olsen RN (offgoing nurse). Report included the following information SBAR, Kardex, ED Summary, [facility] and Recent Results.

## 2021-05-03 NOTE — BH NOTES
Pt admitted today SI with a plan to overdose on fentanyl. Per report has a hx of using crack, cocaine and heroin. Pt states she was having Command A/H to hurt herself. Pt is receiving no out patient  Treatment at this time. Pt states she is homicidal to roommates. Per report pt was given keflex for UTI in er. Allergy to seafood. Pt is calm and cooperative on admission to unit. appears anxious and depressed. Pt denies si/hi/a/v roland. pt states she was SI before coming into the hospital. Pt states her sister encourged her to come in for help. 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 UDS and BAL negative. Pt also states its lots of people running in and out of her home. She is upset about it. Pt appears to be a poor historian. Skin is intact. old scars. Pt given snack and is resting in room.

## 2021-05-03 NOTE — BH NOTES
GROUP THERAPY PROGRESS NOTE    Patient did not participate in Coping Skills group.      Natan Owens MSW

## 2021-05-04 PROBLEM — F14.10 COCAINE USE DISORDER (HCC): Status: ACTIVE | Noted: 2021-05-04

## 2021-05-04 PROBLEM — F11.10: Status: ACTIVE | Noted: 2021-05-04

## 2021-05-04 PROBLEM — F19.94 SUBSTANCE INDUCED MOOD DISORDER (HCC): Status: RESOLVED | Noted: 2021-05-03 | Resolved: 2021-05-04

## 2021-05-04 PROBLEM — F43.21 ADJUSTMENT DISORDER WITH DEPRESSED MOOD: Status: ACTIVE | Noted: 2021-05-04

## 2021-05-04 PROCEDURE — 74011250637 HC RX REV CODE- 250/637: Performed by: PSYCHIATRY & NEUROLOGY

## 2021-05-04 PROCEDURE — 65220000003 HC RM SEMIPRIVATE PSYCH

## 2021-05-04 PROCEDURE — 99223 1ST HOSP IP/OBS HIGH 75: CPT | Performed by: PSYCHIATRY & NEUROLOGY

## 2021-05-04 RX ORDER — TOPIRAMATE 25 MG/1
25 TABLET ORAL 2 TIMES DAILY WITH MEALS
Status: DISCONTINUED | OUTPATIENT
Start: 2021-05-04 | End: 2021-05-06 | Stop reason: HOSPADM

## 2021-05-04 RX ADMIN — TOPIRAMATE 25 MG: 25 TABLET, FILM COATED ORAL at 16:52

## 2021-05-04 RX ADMIN — HYDROXYZINE HYDROCHLORIDE 50 MG: 25 TABLET, FILM COATED ORAL at 15:47

## 2021-05-04 NOTE — BH NOTES
Met with patient one on one. She reports that she has been struggling with drug use (smoking crack cocaine and snorting and injecting heroin) for years and has not been sober since her early 20's. She shared that she has overdosed accidentally multiple times and decided to get help because she was having thoughts of overdosing on purposes because she felt stuck and depressed by her situation. She shared more inform about her situation including finally getting housing and letting drug users stay with her and them taking over. 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 She shared that she wants to be sober and that her uncle just was released from longterm/snf and will be staying with her and she changed the locks which has her feeling safer. She shared that she is not feeling well and described her symptoms. She reports not knowing what withdrawal was but that she has never gone this long without using and is having strong feelings and thoughts to use, restless, anxious feeling, and runny nose. She was educated on talking to her treatment team about her symptoms and her nurse. She was reminded she has medication available that can help with anxiety and may also have other medications for withdrawal. She reports going \"cold-turkey\" with her nicotine habit and that she can not wait to be discharged on Thursday when she can go smoke. She also shared that her only coping skill since admission was to sleep through the symptoms but now that she is awake she is having difficulty. She reports her sister is supportive of her and has been reassuring her and will be there to help her upon discharge. She was not feeling well and continued to rock while laying on her side in bed but did talk to this writer and was pleasant. Discussed with primary nurse that patient was having withdrawal symptoms and patient was wondering if there was anything to help her settle down.      Adolph Butterfield LPC LSATP [facility]

## 2021-05-04 NOTE — GROUP NOTE
[facility]  GROUP DOCUMENTATION INDIVIDUAL                                                                          Group Therapy Note    Date: 5/4/2021    Group Start Time: 1100  Group End Time: 1200  Group Topic: Topic Group    [address] ACUTE BEHAV [facility]    [place] [place] [facility] GROUP    Group Therapy Note    Attendees: 4         Attendance: Did not attend    Patient's Goal:      Interventions/techniques  Chris Ma

## 2021-05-04 NOTE — BH NOTES
PSYCHOSOCIAL ASSESSMENT  :Patient identifying info: Mike Arguelles is a 28 y.o., female admitted 5/2/2021  7:52 PM     Presenting problem and precipitating factors: Pt was admitted on a voluntary basis after aunt and sister brought her to ED for suicidal ideation with plan to overdose by injecting fentanyl. Pt report she has been using crack cocaine for 9 years, snorting heroin for a year and paying for this by \"tricking. \" Tiana Agustin telling her to kill herself. Mental status assessment: Pt was seen in treatment team this morning. Pt is alert and oriented. Pt denies SI/HI. Pt's mood is anxious, affect is WNL. Pt's thought process is coherent. Pt's insight and judgment is impaired, reliability is fair. Social work department will continue to coordinate discharge plans. Strengths: family support and seeking help     Collateral information: SisterMora Hicks     Current psychiatric /substance abuse providers and contact info: None    Previous psychiatric/substance abuse providers and response to treatment: One previous hospitalization at unknown time for SI. Long history of substance abuse. Family history of mental illness or substance abuse: None reported. Substance abuse history:  UDS: Negative; BAL=0  Social History     Tobacco Use    Smoking status: Current Every Day Smoker    Smokeless tobacco: Never Used   Substance Use Topics    Alcohol use: Yes     Comment: daily       History of biomedical complications associated with substance abuse: shakes, sweating    Patient's current acceptance of treatment or motivation for change:  Fair    Family constellation: Single and without children. Is significant other involved?  N/A    Describe support system: Sisters and mom     Describe living arrangements and home environment: Pt reports living in an apartment with a roommate    Health issues:   Hospital Problems  Never Reviewed          Codes Class Noted POA    Substance induced mood disorder [facility]) ICD-10-CM: Q12.05  ICD-9-CM: 292.84  5/3/2021 Unknown              Trauma history: None shared. Legal issues: Denied. History of  service: Denied. Financial status: None. Lutheran/cultural factors: None expressed at this time. Education/work history: 11th grade - report she is unemployed. Have you been licensed as a health care professional (current or ): No    Leisure and recreation preferences: Unknown at this time.      Describe coping skills: Poor and ineffectual.     Obdulia Reyes  2021

## 2021-05-04 NOTE — BH NOTES
GROUP THERAPY PROGRESS NOTE    Patient is participating in a Coping Skills Group     Group time: 45 minutes     Personal goal for participation: To discuss the impact of negative self-talk on mental health and practice positive affirmations to improve mood and view of self    Goal orientation: Personal    Group therapy participation: passive    Therapeutic interventions reviewed and discussed: Patient discussed the impact of negative self-talk on mental health. Patients discussed and wrote down self-defeating statements they make to themselves.  provided psychoeducation on reframing negative-self talk into positive affirmations. Patients completed exercise where they reframed their self-defeating statements into positive affirmations. Patients listened to soothing music and used watercolors to [employer] on and paint a positive affirmation they desire to speak over themselves. Impression of participation: Pt present in dayroom eating her dinner. Pt declined to verbally participate or complete water color activity. Pt sat away from SW and other patients. Pt had depressed mood, flat affect, simple speech.    MAIKOL Brothers

## 2021-05-04 NOTE — PROGRESS NOTES
Assumed care of patient after receiving shift report from outgoing nurse. Patient was in bed resting. Pt was cooperative during assessment. A&O x 4. Affect was sad, mood was anxious and depressed. Hygiene is adequate, independent in ADLs. Gait is steady. Sleep and appetite patterns reported WNL. Denies AVH/SI/HI. Endorses a \"little\" Anxiety/Depression. Last BM was 05/03/21. Pt denies pain. Pt is being monitored with COWs for substance use. Pt encouraged to continue to participate in care. Pt has been observed throughout the night. Total hours slept approximately 11. No s/s of distress noted. Patient has remained safe. Cow scores 0 throughout night.

## 2021-05-04 NOTE — BH NOTES
GROUP THERAPY PROGRESS NOTE    Patient did not participate in Coping Skills group.      Gala Adorno LPC LSATP [facility]

## 2021-05-04 NOTE — GROUP NOTE
DEBORAH  GROUP DOCUMENTATION INDIVIDUAL                                                                          Group Therapy Note    Date: 5/4/2021    Group Start Time: 1500  Group End Time: 1600  Group Topic: Recreational/Music Therapy    [facility] [address] ACUTE BEHAV [facility]    [place], [address] [facility] GROUP DOCUMENTATION GROUP    Group Therapy Note    Attendees: 6         Attendance: Did not attend    Patient's Goal:      Interventions/techniques:Gladis Pickens

## 2021-05-04 NOTE — H&P
INITIAL PSYCHIATRIC EVALUATION            IDENTIFICATION:    Patient Name  Arabella Goins   Date of Birth 1990   [facility] [id number]   Medical Record Number  [id number]      Age  28 y.o. PCP None   Admit date:  5/2/2021    Room Number  111/29  @ [facility]   Date of Service  5/4/2021            HISTORY         REASON FOR HOSPITALIZATION:  CC: \"suicidal ideation / detox\". Pt admitted under a voluntary basis for suicidal ideations proving to be an imminent danger to self and an inability to care for self. HISTORY OF PRESENT ILLNESS:    The patient, Arabella Goins, is a 28 y.o. BLACK/ female with a past psychiatric history significant for cocaine and methamphetamine use disorder, who presents at this time with complaints of (and/or evidence of) the following emotional symptoms: depression and suicidal thoughts/threats. Additional symptomatology include escalation of substance abuse. The above symptoms have been present for 2+ weeks. These symptoms are of moderate to high severity. These symptoms are constant in nature. The patient's condition has been precipitated by psychosocial stressors. Patient's condition made worse by continued illicit drug use. UDS: negative; BAL=0. The patient is a fair historian. The patient corroborates the above narrative. The patient contracts for safety on the unit and gives consent for the team to contact collateral. The patient is amenable to initiating treatment while on the unit. The patient reports that she has been feeling fairly depressed and using cocaine and heroin. She felt overwhelmed and stated she was considering injecting cocaine into her vein to end her own life. She is ambivalent about substance abuse rehab, but agrees to detox services. Patient requests STI testing. ALLERGIES:   Allergies   Allergen Reactions    Seafood Swelling      MEDICATIONS PRIOR TO ADMISSION:   No medications prior to admission.       PAST MEDICAL HISTORY:   History reviewed. No pertinent past medical history. History reviewed. No pertinent surgical history. SOCIAL HISTORY:  The patient is currently working as a ; the patient is a smoker, and smokes up to  1 ppd; the patient's marital status is single; the patient does not have children; the patient reports the highest level of education achieved is 11th grade. FAMILY HISTORY: History reviewed, pertinent family history as below:   History reviewed. No pertinent family history. REVIEW OF SYSTEMS:   Pertinent items are noted in the History of Present Illness. All other Systems reviewed and are considered negative. MENTAL STATUS EXAM & VITALS     MENTAL STATUS EXAM (MSE):    MSE FINDINGS ARE WITHIN NORMAL LIMITS (WNL) UNLESS OTHERWISE STATED BELOW. ( ALL OF THE BELOW CATEGORIES OF THE MSE HAVE BEEN REVIEWED (reviewed 5/4/2021) AND UPDATED AS DEEMED APPROPRIATE )  General Presentation age appropriate, cooperative and guarded   Orientation oriented to time, place and person   Vital Signs  See below (reviewed 5/4/2021); Vital Signs (BP, Pulse, & Temp) are within normal limits if not listed below.    Gait and Station Stable/steady, no ataxia   Musculoskeletal System No extrapyramidal symptoms (EPS); no abnormal muscular movements or Tardive Dyskinesia (TD); muscle strength and tone are within normal limits   Language No aphasia or dysarthria   Speech:  normal volume   Thought Processes logical; normal rate of thoughts; poor abstract reasoning/computation   Thought Associations goal directed   Thought Content preoccupations   Suicidal Ideations contracts for safety   Homicidal Ideations none   Mood:  depressed and anhedonia   Affect:  constricted and mood-congruent   Memory recent  intact   Memory remote:  intact   Concentration/Attention:  intact   Fund of Knowledge average   Insight:  limited   Reliability fair   Judgment:  poor          VITALS:     Patient Vitals for the past 24 hrs:   Temp Pulse Resp BP SpO2   05/04/21 0838 97.8 °F (36.6 °C) 74 18 112/66 99 %   05/03/21 2020 98.2 °F (36.8 °C) 72 18 111/70 98 %   05/03/21 1600 97.8 °F (36.6 °C) 78 18 100/67 100 %     Wt Readings from Last 3 Encounters:   05/03/21 52.1 kg (114 lb 12.8 oz)     Temp Readings from Last 3 Encounters:   05/04/21 97.8 °F (36.6 °C)     BP Readings from Last 3 Encounters:   05/04/21 112/66     Pulse Readings from Last 3 Encounters:   05/04/21 74            DATA     LABORATORY DATA:  Labs Reviewed   CBC WITH AUTOMATED DIFF - Abnormal; Notable for the following components:       Result Value    ABS.  LYMPHOCYTES 3.8 (*)     All other components within normal limits   METABOLIC PANEL, COMPREHENSIVE - Abnormal; Notable for the following components:    Sodium 133 (*)     Glucose 171 (*)     Calcium 8.4 (*)     Bilirubin, total 0.1 (*)     Albumin 3.3 (*)     A-G Ratio 0.9 (*)     All other components within normal limits   SALICYLATE - Abnormal; Notable for the following components:    Salicylate level <2.4 (*)     All other components within normal limits   ACETAMINOPHEN - Abnormal; Notable for the following components:    Acetaminophen level <2 (*)     All other components within normal limits   URINALYSIS W/ REFLEX CULTURE - Abnormal; Notable for the following components:    Leukocyte Esterase SMALL (*)     Bacteria 1+ (*)     All other components within normal limits   ETHYL ALCOHOL   DRUG SCREEN, URINE   HCG QL SERUM   COVID-19 WITH INFLUENZA A/B   CT/NG/T.VAGINALIS AMPLIFICATION   HCG URINE, QL. - POC     Admission on 05/02/2021   Component Date Value Ref Range Status    WBC 05/02/2021 9.5  3.6 - 11.0 K/uL Final    RBC 05/02/2021 4.52  3.80 - 5.20 M/uL Final    HGB 05/02/2021 14.4  11.5 - 16.0 g/dL Final    HCT 05/02/2021 42.4  35.0 - 47.0 % Final    MCV 05/02/2021 93.8  80.0 - 99.0 FL Final    MCH 05/02/2021 31.9  26.0 - 34.0 PG Final    MCHC 05/02/2021 34.0  30.0 - 36.5 g/dL Final    RDW 05/02/2021 11.9  11.5 - 14.5 % Final    PLATELET 60/20/7329 774  150 - 400 K/uL Final    MPV 05/02/2021 9.9  8.9 - 12.9 FL Final    NRBC 05/02/2021 0.0  0  WBC Final    ABSOLUTE NRBC 05/02/2021 0.00  0.00 - 0.01 K/uL Final    NEUTROPHILS 05/02/2021 48  32 - 75 % Final    LYMPHOCYTES 05/02/2021 40  12 - 49 % Final    MONOCYTES 05/02/2021 7  5 - 13 % Final    EOSINOPHILS 05/02/2021 4  0 - 7 % Final    BASOPHILS 05/02/2021 1  0 - 1 % Final    IMMATURE GRANULOCYTES 05/02/2021 0  0.0 - 0.5 % Final    ABS. NEUTROPHILS 05/02/2021 4.5  1.8 - 8.0 K/UL Final    ABS. LYMPHOCYTES 05/02/2021 3.8* 0.8 - 3.5 K/UL Final    ABS. MONOCYTES 05/02/2021 0.7  0.0 - 1.0 K/UL Final    ABS. EOSINOPHILS 05/02/2021 0.4  0.0 - 0.4 K/UL Final    ABS. BASOPHILS 05/02/2021 0.1  0.0 - 0.1 K/UL Final    ABS. IMM. GRANS. 05/02/2021 0.0  0.00 - 0.04 K/UL Final    DF 05/02/2021 AUTOMATED    Final    Sodium 05/02/2021 133* 136 - 145 mmol/L Final    Potassium 05/02/2021 3.7  3.5 - 5.1 mmol/L Final    Chloride 05/02/2021 99  97 - 108 mmol/L Final    CO2 05/02/2021 28  21 - 32 mmol/L Final    Anion gap 05/02/2021 6  5 - 15 mmol/L Final    Glucose 05/02/2021 171* 65 - 100 mg/dL Final    BUN 05/02/2021 15  6 - 20 MG/DL Final    Creatinine 05/02/2021 0.80  0.55 - 1.02 MG/DL Final    BUN/Creatinine ratio 05/02/2021 19  12 - 20   Final    GFR est AA 05/02/2021 >60  >60 ml/min/1.73m2 Final    GFR est non-AA 05/02/2021 >60  >60 ml/min/1.73m2 Final    Calcium 05/02/2021 8.4* 8.5 - 10.1 MG/DL Final    Bilirubin, total 05/02/2021 0.1* 0.2 - 1.0 MG/DL Final    ALT (SGPT) 05/02/2021 33  12 - 78 U/L Final    AST (SGOT) 05/02/2021 16  15 - 37 U/L Final    Alk.  phosphatase 05/02/2021 87  45 - 117 U/L Final    Protein, total 05/02/2021 7.0  6.4 - 8.2 g/dL Final    Albumin 05/02/2021 3.3* 3.5 - 5.0 g/dL Final    Globulin 05/02/2021 3.7  2.0 - 4.0 g/dL Final    A-G Ratio 05/02/2021 0.9* 1.1 - 2.2   Final    ALCOHOL(ETHYL),SERUM 05/02/2021 <10  <10 MG/DL Final    Salicylate level 40/08/1999 <1.7* 2.8 - 20.0 MG/DL Final    Acetaminophen level 05/02/2021 <2* 10 - 30 ug/mL Final    AMPHETAMINES 05/02/2021 Negative  NEG   Final    BARBITURATES 05/02/2021 Negative  NEG   Final    BENZODIAZEPINES 05/02/2021 Negative  NEG   Final    COCAINE 05/02/2021 Negative  NEG   Final    METHADONE 05/02/2021 Negative  NEG   Final    OPIATES 05/02/2021 Negative  NEG   Final    PCP(PHENCYCLIDINE) 05/02/2021 Negative  NEG   Final    THC (TH-CANNABINOL) 05/02/2021 Negative  NEG   Final    Drug screen comment 05/02/2021 (NOTE)   Final    Color 05/02/2021 YELLOW/STRAW    Final    Appearance 05/02/2021 CLEAR  CLEAR   Final    Specific gravity 05/02/2021 1.005  1.003 - 1.030   Final    pH (UA) 05/02/2021 7.5  5.0 - 8.0   Final    Protein 05/02/2021 Negative  NEG mg/dL Final    Glucose 05/02/2021 Negative  NEG mg/dL Final    Ketone 05/02/2021 Negative  NEG mg/dL Final    Bilirubin 05/02/2021 Negative  NEG   Final    Blood 05/02/2021 Negative  NEG   Final    Urobilinogen 05/02/2021 1.0  0.2 - 1.0 EU/dL Final    Nitrites 05/02/2021 Negative  NEG   Final    Leukocyte Esterase 05/02/2021 SMALL* NEG   Final    WBC 05/02/2021 5-10  0 - 4 /hpf Final    RBC 05/02/2021 0-5  0 - 5 /hpf Final    Epithelial cells 05/02/2021 FEW  FEW /lpf Final    Bacteria 05/02/2021 1+* NEG /hpf Final    UA:UC IF INDICATED 05/02/2021 CULTURE NOT INDICATED BY UA RESULT  CNI   Final    HCG, Ql. 05/02/2021 Negative  NEG   Final    SARS-CoV-2 05/02/2021 Not detected  NOTD   Final    Influenza A by PCR 05/02/2021 Not detected  NOTD   Final    Influenza B by PCR 05/02/2021 Not detected  NOTD   Final    Ventricular Rate 05/02/2021 66  BPM Final    Atrial Rate 05/02/2021 66  BPM Final    P-R Interval 05/02/2021 184  ms Final    QRS Duration 05/02/2021 106  ms Final    Q-T Interval 05/02/2021 410  ms Final    QTC Calculation (Bezet) 05/02/2021 429  ms Final    Calculated P Axis 05/02/2021 67  degrees Final    Calculated R Axis 05/02/2021 70  degrees Final    Calculated T Axis 05/02/2021 44  degrees Final    Diagnosis 05/02/2021    Final                    Value:Normal sinus rhythm with sinus arrhythmia  Normal ECG  No previous ECGs available  Confirmed by Sergio John M.D., [facility] ([id number]) on 5/3/2021 7:51:32 AM      Pregnancy test,urine (POC) 05/02/2021 Negative  NEG   Final        RADIOLOGY REPORTS:  No results found for this or any previous visit. No results found. MEDICATIONS       ALL MEDICATIONS  Current Facility-Administered Medications   Medication Dose Route Frequency    topiramate (TOPAMAX) tablet 25 mg  25 mg Oral BID WITH MEALS    OLANZapine (ZyPREXA) tablet 5 mg  5 mg Oral Q6H PRN    haloperidol lactate (HALDOL) injection 5 mg  5 mg IntraMUSCular Q6H PRN    benztropine (COGENTIN) tablet 1 mg  1 mg Oral BID PRN    diphenhydrAMINE (BENADRYL) injection 50 mg  50 mg IntraMUSCular BID PRN    hydrOXYzine HCL (ATARAX) tablet 50 mg  50 mg Oral TID PRN    LORazepam (ATIVAN) injection 1 mg  1 mg IntraMUSCular Q4H PRN    traZODone (DESYREL) tablet 50 mg  50 mg Oral QHS PRN    acetaminophen (TYLENOL) tablet 650 mg  650 mg Oral Q4H PRN    magnesium hydroxide (MILK OF MAGNESIA) 400 mg/5 mL oral suspension 30 mL  30 mL Oral DAILY PRN    cloNIDine HCL (CATAPRES) tablet 0.1 mg  0.1 mg Oral Q4H PRN      SCHEDULED MEDICATIONS  Current Facility-Administered Medications   Medication Dose Route Frequency    topiramate (TOPAMAX) tablet 25 mg  25 mg Oral BID WITH MEALS                ASSESSMENT & PLAN        The patient, Ling Newton, is a 28 y.o.  female who presents at this time for treatment of the following diagnoses:  Patient Active Hospital Problem List:   Adjustment disorder with depressed mood    Assessment: suspect cocaine withdrawal dysphoria.  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 She is a good candidate for rehab services. Will start agent to control cravings, observe off substances. Plan:   - Observe off substances  - f/u HIV, RPR, GC/Chyl  - START Topamax 25 mg BID for cocaine use disorder  - IGM therapy as tolerated  - Expand database / obtain collateral  - Dispo planning (home vs rehab)           A coordinated, multidisplinary treatment team (includes the nurse, unit pharmacist,  and writer) round was conducted for this initial evaluation with the patient present. The following regarding medications was addressed during rounds with patient: the risks and benefits of the proposed medication. The patient was given the opportunity to ask questions. Informed consent given to the use of the above medications. I will continue to adjust psychiatric and non-psychiatric medications (see above \"medication\" section and orders section for details) as deemed appropriate & based upon diagnoses and response to treatment. I have reviewed admission (and previous/old) labs and medical tests in the EHR and or transferring hospital documents. I will continue to order blood tests/labs and diagnostic tests as deemed appropriate and review results as they become available (see orders for details). I have reviewed old psychiatric and medical records available in the EHR. I Will order additional psychiatric records from other institutions to further elucidate the nature of patient's psychopathology and review once available. I will gather additional collateral information from friends, family and o/p treatment team to further elucidate the nature of patient's psychopathology and baselline level of psychiatric functioning.     I certify that this patient's inpatient psychiatric hospital services are required for treatment that could reasonably be expected to improve the patient's condition, or for diagnostic study, and that the patient continues to need, on a daily basis, active treatment furnished directly by or requiring the supervision of inpatient psychiatric facility personnel. In addition, the hospital records show that services furnished were intensive treatment services, admission or related services, or equivalent services.       ESTIMATED LENGTH OF STAY:  3-5 days       STRENGTHS:  Exercising self-direction/Resourceful, Access to housing/residential stability and Awareness of Substance abuse issues                                        SIGNED:    Moraima Butler MD  5/4/2021

## 2021-05-04 NOTE — BH NOTES
GROUP THERAPY PROGRESS NOTE    Patient did not participate in Substance abuse/Coping Skills group.      Jorje Chand LPC [facility]

## 2021-05-04 NOTE — BH NOTES
0730 - Pt report was received from off going shift's RN.     0800 - Pt assessment was performed. Pt is alert and oriented x 4. Pt is currently resting in bed. She does not appear to be experiencing any withdrawal symptoms at this time. Pt denies SI/HI and denies hallucinations. 2025 - Vitals were obtained. 1550 - Pt received prn hydroxyzine 50 mg po for anxiety. 1600 - COW score 10. Vitals were obtained. Pulse 102; /69. R -18. Temp 98.6; O2 98%. 1700 - Pt is currently resting. She expressed that the prn medication provided relief with anxiety. Pt also reports that she is still experiencing cravings for \"crack\" but symptoms have decreased. 1900 - Pt remains in bed resting. She has been isolative to her room for most of the shift. Staff will continue to monitor pt's safety and needs.

## 2021-05-04 NOTE — PROGRESS NOTES
[facility] Admission Pharmacy Medication Reconciliation     Information obtained from: Patient interview, [facility]   RxQuery data available1:No    Comments/recommendations:  Declines taking prescription or over-the-counter medications    Medication changes (since last review): None    [facility] was accessed to determine fill history of any controlled medications. She has not filled any controlled medications within the past 2 years. 1RxQuery pharmacy benefit data reflects medications filled and processed through the patient's insurance, however                this data does NOT capture whether the medication was picked up or is currently being taken by the patient. Total Time Spent: 5 minutes    Past Medical History/Disease States:  History reviewed. No pertinent past medical history. Patient allergies:    Allergies as of 05/02/2021 - Review Complete 05/02/2021   Allergen Reaction Noted    Seafood Swelling 05/02/2021       Prior to admission medications:   None        Thank you,  RUBIO [facility] Specialist, [address] [place] [address] Nw: [phone] ([id number])  Pharmacy: [phone] ([id number])

## 2021-05-04 NOTE — GROUP NOTE
DEBORAH  GROUP DOCUMENTATION INDIVIDUAL                                                                          Group Therapy Note    Date: 5/4/2021    Group Start Time: 0900  Group End Time: 1000  Group Topic: Topic Group    [facility] ACUTE BEHAV [facility]    [place] [address] GROUP    Group Therapy Note    Attendees: 7         Attendance: Did not attend    Patient's Goal:      Interventions/techniques  Jero Rodriguez

## 2021-05-04 NOTE — PROGRESS NOTES
Problem: Suicide  Goal: *STG: Remains safe in hospital  Outcome: Progressing Towards Goal  Goal: *STG: Seeks staff when feelings of self harm or harm towards others arise  Outcome: Progressing Towards Goal  Goal: *STG/LTG: No longer expresses self destructive or suicidal thoughts  Outcome: Progressing Towards Goal     Problem: Depressed Mood (Adult/Pediatric)  Goal: *STG: Participates in treatment plan  Outcome: Progressing Towards Goal  Goal: *STG: Remains safe in hospital  Outcome: Progressing Towards Goal     Problem: Crack/Cocaine Withdrawal  Goal: *STG: Remains safe in hospital  Outcome: Progressing Towards Goal  Goal: *STG: Vital signs within defined limits  Outcome: Progressing Towards Goal     Problem: Anxiety-Behavioral Health (Adult/Pediatric)  Goal: *STG: Remains safe in hospital  Outcome: Progressing Towards Goal  Goal: *STG: Seeks staff when feelings of anxiety and fear arise  Outcome: Progressing Towards Goal

## 2021-05-05 LAB
HAV IGM SER QL: NONREACTIVE
HBV CORE IGM SER QL: NONREACTIVE
HBV SURFACE AG SER QL: <0.1 INDEX
HBV SURFACE AG SER QL: NEGATIVE
HCV AB SER IA-ACNC: >11 INDEX
HCV AB SERPL QL IA: REACTIVE
HCV COMMENT,HCGAC: ABNORMAL
HIV 1+2 AB+HIV1 P24 AG SERPL QL IA: NONREACTIVE
HIV12 RESULT COMMENT, HHIVC: NORMAL
RPR SER QL: NONREACTIVE
SP1: ABNORMAL
SP2: ABNORMAL
SP3: ABNORMAL

## 2021-05-05 PROCEDURE — 36415 COLL VENOUS BLD VENIPUNCTURE: CPT

## 2021-05-05 PROCEDURE — 80074 ACUTE HEPATITIS PANEL: CPT

## 2021-05-05 PROCEDURE — 87389 HIV-1 AG W/HIV-1&-2 AB AG IA: CPT

## 2021-05-05 PROCEDURE — 99232 SBSQ HOSP IP/OBS MODERATE 35: CPT | Performed by: PSYCHIATRY & NEUROLOGY

## 2021-05-05 PROCEDURE — 86592 SYPHILIS TEST NON-TREP QUAL: CPT

## 2021-05-05 PROCEDURE — 65220000003 HC RM SEMIPRIVATE PSYCH

## 2021-05-05 PROCEDURE — 86803 HEPATITIS C AB TEST: CPT

## 2021-05-05 PROCEDURE — 74011250637 HC RX REV CODE- 250/637: Performed by: PSYCHIATRY & NEUROLOGY

## 2021-05-05 PROCEDURE — 87661 TRICHOMONAS VAGINALIS AMPLIF: CPT

## 2021-05-05 RX ADMIN — HYDROXYZINE HYDROCHLORIDE 50 MG: 25 TABLET, FILM COATED ORAL at 21:27

## 2021-05-05 RX ADMIN — TOPIRAMATE 25 MG: 25 TABLET, FILM COATED ORAL at 08:30

## 2021-05-05 RX ADMIN — TRAZODONE HYDROCHLORIDE 50 MG: 50 TABLET ORAL at 21:27

## 2021-05-05 RX ADMIN — TOPIRAMATE 25 MG: 25 TABLET, FILM COATED ORAL at 17:30

## 2021-05-05 NOTE — BH NOTES
Behavioral Health Treatment Team Note       Patient goal(s) for today: Take medications as prescribed. Treatment team focus/goals: detox and dispo planning   Progress note: Pt was seen in treatment team this morning. Pt is alert and oriented. Pt denies SI/HI. Pt's mood is anxious, affect is constricted. Pt's thought process is coherent. Pt's insight and judgment is poor, reliability is fair. Experiencing withdrawal symptoms and  Social work department will continue to coordinate discharge plans. LOS:  2  Expected LOS: 5/6    Financial concerns/prescription coverage: [address]  Date of last family contact:  None at this time-  No CHRIS signed     Family requesting physician contact today:  No  Discharge plan: Home  Guns in the home: None involved. Outpatient provider(s): To be linked. She has a Community-based Care Manager, [address] [place] [address] Jeanie ([phone]). She also has addiction recovery services through her [employer] plan. Participating treatment team members:  MAIKOL Mar and Dr. Lucrecia Albarado MD

## 2021-05-05 NOTE — BH NOTES
GROUP THERAPY PROGRESS NOTE    Patient did not participate in Discharge Planning Group.      Rafaela Gonzalez LPC LSATP CSAC

## 2021-05-05 NOTE — GROUP NOTE
DEBORAH  GROUP DOCUMENTATION INDIVIDUAL Group Therapy Note Date: 5/5/2021 Group Start Time: 1400 Group End Time: 1500 Group Topic: Recreational/Music Therapy [facility] 3 ACUTE BEHAV [facility], [address] GROUP DOCUMENTATION GROUP Group Therapy Note Attendees: 5 Attendance: Did not attend Patient's Goal: Interventions/techniques

## 2021-05-05 NOTE — PROGRESS NOTES
The patient was in bed when staff arrived, but did get up to speak to her sister. Her sister did request to speak to the  today. The patient slept well through the night and had safety maintained. She was able to sleep a total of 10.5 hours. She was cooperative with staff and had several labs drawn and sent off this morning. No other issues or concerns.

## 2021-05-05 NOTE — PROGRESS NOTES
Problem: Suicide  Goal: *STG: Remains safe in hospital  Outcome: Progressing Towards Goal  Goal: *STG:  Verbalizes alternative ways of dealing with maladaptive feelings/behaviors  Outcome: Progressing Towards Goal  Goal: *STG/LTG: No longer expresses self destructive or suicidal thoughts  Outcome: Progressing Towards Goal     Problem: Depressed Mood (Adult/Pediatric)  Goal: *STG: Demonstrates reduction in symptoms and increase in insight into coping skills/future focused  Outcome: Progressing Towards Goal  Goal: *STG: Complies with medication therapy  Outcome: Progressing Towards Goal     0700: Shift change report given to Alina CRESPO (oncoming nurse) by Glenroy Lee (offgoing nurse). Report included the following information SBAR, Kardex, Intake/Output and Recent Results. 8231-3044: Assumed care of patient. Patient denies anxiety, depression, SI, HI, AVH and pain. Patient med and meal compliant. Patient sleeping in bed. 5165-9274: Patient in bed sleeping. No signs of distress noted at this moment. 9665-5315: Patient to dayroom to eat lunch. No voiced concerns. 4165-6093: Med and meal compliant. Patient had a phone call with sister. Patient upset due to sister stating that she is not ready to be discharged. Patient stated that it is not the sisters decision. Patient to bedroom to sleep.

## 2021-05-05 NOTE — GROUP NOTE
DEBORAH  GROUP DOCUMENTATION INDIVIDUAL Group Therapy Note Date: 5/5/2021 Group Start Time: 1000 Group End Time: 1100 Group Topic: Topic Group [facility] 3 ACUTE BEHAV [facility], [address] [facility] [place] [place] GROUP DOCUMENTATION GROUP Group Therapy Note Attendees: 5 Attendance: Did not attend Patient's Goal: Interventions/techniques Cyndee Merritt

## 2021-05-05 NOTE — PROGRESS NOTES
Problem: Suicide  Goal: *STG: Remains safe in hospital  Outcome: Progressing Towards Goal  Goal: *STG: Seeks staff when feelings of self harm or harm towards others arise  Outcome: Progressing Towards Goal  Goal: *STG: Attends activities and groups  Outcome: Not Progressing Towards Goal  Goal: *STG:  Verbalizes alternative ways of dealing with maladaptive feelings/behaviors  Outcome: Not Progressing Towards Goal  Goal: *STG/LTG: Complies with medication therapy  Outcome: Progressing Towards Goal  Goal: *STG/LTG: No longer expresses self destructive or suicidal thoughts  Outcome: Progressing Towards Goal  Goal: *LTG:  Identifies available community resources  Outcome: Not Progressing Towards Goal  Goal: *LTG:  Develops proactive suicide prevention plan  Outcome: Not Progressing Towards Goal  Goal: Interventions  Outcome: Not Progressing Towards Goal

## 2021-05-05 NOTE — BH NOTES
GROUP THERAPY PROGRESS NOTE    Patient did not participate in Coping Skills Group.      Janelle Bach LPC LSATP CSAC

## 2021-05-06 VITALS
RESPIRATION RATE: 14 BRPM | TEMPERATURE: 98.2 F | DIASTOLIC BLOOD PRESSURE: 61 MMHG | HEART RATE: 73 BPM | WEIGHT: 111 LBS | OXYGEN SATURATION: 100 % | SYSTOLIC BLOOD PRESSURE: 94 MMHG | BODY MASS INDEX: 20.96 KG/M2 | HEIGHT: 61 IN

## 2021-05-06 LAB
HBV CORE AB SERPL QL IA: NEGATIVE
HBV CORE IGM SERPL QL IA: NEGATIVE
HBV E AB SERPL QL IA: NEGATIVE
HBV E AG SERPL QL IA: NEGATIVE
HBV SURFACE AB SER QL: REACTIVE INDEX VALUE
HBV SURFACE AG SERPL QL IA: NEGATIVE
HCV AB S/CO SERPL IA: >11 S/CO RATIO (ref 0–0.9)
HCV AB S/CO SERPL IA: NORMAL

## 2021-05-06 PROCEDURE — 74011250637 HC RX REV CODE- 250/637: Performed by: PSYCHIATRY & NEUROLOGY

## 2021-05-06 PROCEDURE — 99239 HOSP IP/OBS DSCHRG MGMT >30: CPT | Performed by: PSYCHIATRY & NEUROLOGY

## 2021-05-06 RX ORDER — TRAZODONE HYDROCHLORIDE 50 MG/1
50 TABLET ORAL
Qty: 30 TAB | Refills: 1 | Status: SHIPPED | OUTPATIENT
Start: 2021-05-06

## 2021-05-06 RX ORDER — TOPIRAMATE 25 MG/1
25 TABLET ORAL 2 TIMES DAILY WITH MEALS
Qty: 60 TAB | Refills: 1 | Status: SHIPPED | OUTPATIENT
Start: 2021-05-06

## 2021-05-06 RX ORDER — HYDROXYZINE 50 MG/1
50 TABLET, FILM COATED ORAL
Qty: 60 TAB | Refills: 1 | Status: SHIPPED | OUTPATIENT
Start: 2021-05-06 | End: 2021-07-05

## 2021-05-06 RX ADMIN — TOPIRAMATE 25 MG: 25 TABLET, FILM COATED ORAL at 09:01

## 2021-05-06 NOTE — PROGRESS NOTES
The patient was in bed when staff arrived. She got out of bed to speak to her uncle on the phone as well as being assessed by staff. She reported no SI/HII/AVH, depression, or pain. She did report anxiety and cravings for illicit substances. She was given 50mg trazodone and 50mg atarax at 2127 for anxiety and sleep. The patient was able to sleep 10 hours and have safety maintained. No other issues or concerns.

## 2021-05-06 NOTE — BH NOTES
Behavioral Health Transition Record to Provider    Patient Name: Caterina Stewart  YOB: 1990  Medical Record Number: [id number]  Date of Admission: 5/2/2021  Date of Discharge: 5/6/2021    Attending Provider: Faith House, *  Discharging Provider: Zack Rivas MD  To contact this individual call [phone]  and ask the  to page. If unavailable, ask to be transferred to [facility] Provider on call. [facility] Provider will be available on call 24/7 and during holidays. Primary Care Provider: None    Allergies   Allergen Reactions    Seafood Swelling       Reason for Admission: Psychosis     Admission Diagnosis: Substance induced mood disorder ([facility] [F19.94]    * No surgery found *    Results for orders placed or performed during the hospital encounter of 05/02/21   CBC WITH AUTOMATED DIFF   Result Value Ref Range    WBC 9.5 3.6 - 11.0 K/uL    RBC 4.52 3.80 - 5.20 M/uL    HGB 14.4 11.5 - 16.0 g/dL    HCT 42.4 35.0 - 47.0 %    MCV 93.8 80.0 - 99.0 FL    MCH 31.9 26.0 - 34.0 PG    MCHC 34.0 30.0 - 36.5 g/dL    RDW 11.9 11.5 - 14.5 %    PLATELET 758 079 - 608 K/uL    MPV 9.9 8.9 - 12.9 FL    NRBC 0.0 0  WBC    ABSOLUTE NRBC 0.00 0.00 - 0.01 K/uL    NEUTROPHILS 48 32 - 75 %    LYMPHOCYTES 40 12 - 49 %    MONOCYTES 7 5 - 13 %    EOSINOPHILS 4 0 - 7 %    BASOPHILS 1 0 - 1 %    IMMATURE GRANULOCYTES 0 0.0 - 0.5 %    ABS. NEUTROPHILS 4.5 1.8 - 8.0 K/UL    ABS. LYMPHOCYTES 3.8 (H) 0.8 - 3.5 K/UL    ABS. MONOCYTES 0.7 0.0 - 1.0 K/UL    ABS. EOSINOPHILS 0.4 0.0 - 0.4 K/UL    ABS. BASOPHILS 0.1 0.0 - 0.1 K/UL    ABS. IMM.  GRANS. 0.0 0.00 - 0.04 K/UL    DF AUTOMATED     METABOLIC PANEL, COMPREHENSIVE   Result Value Ref Range    Sodium 133 (L) 136 - 145 mmol/L    Potassium 3.7 3.5 - 5.1 mmol/L    Chloride 99 97 - 108 mmol/L    CO2 28 21 - 32 mmol/L    Anion gap 6 5 - 15 mmol/L    Glucose 171 (H) 65 - 100 mg/dL    BUN 15 6 - 20 MG/DL    Creatinine 0.80 0.55 - 1.02 MG/DL    BUN/Creatinine ratio 19 12 - 20      GFR est AA >60 >60 ml/min/1.73m2    GFR est non-AA >60 >60 ml/min/1.73m2    Calcium 8.4 (L) 8.5 - 10.1 MG/DL    Bilirubin, total 0.1 (L) 0.2 - 1.0 MG/DL    ALT (SGPT) 33 12 - 78 U/L    AST (SGOT) 16 15 - 37 U/L    Alk.  phosphatase 87 45 - 117 U/L    Protein, total 7.0 6.4 - 8.2 g/dL    Albumin 3.3 (L) 3.5 - 5.0 g/dL    Globulin 3.7 2.0 - 4.0 g/dL    A-G Ratio 0.9 (L) 1.1 - 2.2     ETHYL ALCOHOL   Result Value Ref Range    ALCOHOL(ETHYL),SERUM <05 <76 MG/DL   SALICYLATE   Result Value Ref Range    Salicylate level <2.2 (L) 2.8 - 20.0 MG/DL   ACETAMINOPHEN   Result Value Ref Range    Acetaminophen level <2 (L) 10 - 30 ug/mL   DRUG SCREEN, URINE   Result Value Ref Range    AMPHETAMINES Negative NEG      BARBITURATES Negative NEG      BENZODIAZEPINES Negative NEG      COCAINE Negative NEG      METHADONE Negative NEG      OPIATES Negative NEG      PCP(PHENCYCLIDINE) Negative NEG      THC (TH-CANNABINOL) Negative NEG      Drug screen comment (NOTE)    URINALYSIS W/ REFLEX CULTURE    Specimen: Urine   Result Value Ref Range    Color YELLOW/STRAW      Appearance CLEAR CLEAR      Specific gravity 1.005 1.003 - 1.030      pH (UA) 7.5 5.0 - 8.0      Protein Negative NEG mg/dL    Glucose Negative NEG mg/dL    Ketone Negative NEG mg/dL    Bilirubin Negative NEG      Blood Negative NEG      Urobilinogen 1.0 0.2 - 1.0 EU/dL    Nitrites Negative NEG      Leukocyte Esterase SMALL (A) NEG      WBC 5-10 0 - 4 /hpf    RBC 0-5 0 - 5 /hpf    Epithelial cells FEW FEW /lpf    Bacteria 1+ (A) NEG /hpf    UA:UC IF INDICATED CULTURE NOT INDICATED BY UA RESULT CNI     HCG QL SERUM   Result Value Ref Range    HCG, Ql. Negative NEG     COVID-19 WITH INFLUENZA A/B   Result Value Ref Range    SARS-CoV-2 Not detected NOTD      Influenza A by PCR Not detected NOTD      Influenza B by PCR Not detected NOTD     HIV 1/2 AG/AB, 4TH GENERATION,W RFLX CONFIRM   Result Value Ref Range    HIV 1/2 Interpretation NONREACTIVE NR      HIV 1/2 result comment SEE NOTE     RPR   Result Value Ref Range    RPR NONREACTIVE NR     CHRONIC HEPATITIS PANEL   Result Value Ref Range    Hep B surface Ag screen Negative Negative      Hepatitis Be Antigen Negative Negative      Hep B Core Ab, IgM Negative Negative      Hep B Core Ab, total Negative Negative      Hepatitis Be Antibody PENDING     Anti-HBs Reactive Index Value    HCV Ab >11.0 (H) 0.0 - 0.9 s/co ratio   HEPATITIS PANEL, ACUTE   Result Value Ref Range    Hepatitis A, IgM NONREACTIVE NR      __          Hepatitis B surface Ag <0.10 Index    Hep B surface Ag Interp. Negative NEG      __          Hepatitis B core, IgM NONREACTIVE NR      __          Hepatitis C virus Ab >11.00 Index    Hep C virus Ab Interp. REACTIVE (A) NR      Hep C  virus Ab comment Method used is Siemens AdvZowPowaur     HCV COMMENT   Result Value Ref Range    COMMENT Comment     HCG URINE, QL. - POC   Result Value Ref Range    Pregnancy test,urine (POC) Negative NEG     EKG, 12 LEAD, INITIAL   Result Value Ref Range    Ventricular Rate 66 BPM    Atrial Rate 66 BPM    P-R Interval 184 ms    QRS Duration 106 ms    Q-T Interval 410 ms    QTC Calculation (Bezet) 429 ms    Calculated P Axis 67 degrees    Calculated R Axis 70 degrees    Calculated T Axis 44 degrees    Diagnosis       Normal sinus rhythm with sinus arrhythmia  Normal ECG  No previous ECGs available  Confirmed by Jayne Werner M.D., [facility] ([id number]) on 5/3/2021 7:51:32 AM         Immunizations administered during this encounter: There is no immunization history on file for this patient. Screening for Metabolic Disorders for Patients on Antipsychotic Medications  (Data obtained from the EMR)    Estimated Body Mass Index  Estimated body mass index is 20.97 kg/m² as calculated from the following:    Height as of this encounter: 5' 1\" (1.549 m). Weight as of this encounter: 50.3 kg (111 lb).      Vital Signs/Blood Pressure  Visit Vitals  BP 94/61 (BP Patient Position: Lying)   Pulse 73   Temp 98.2 °F (36.8 °C)   Resp 14   Ht 5' 1\" (1.549 m)   Wt 50.3 kg (111 lb)   LMP  (LMP Unknown)   SpO2 100%   Breastfeeding No   BMI 20.97 kg/m²       Blood Glucose/Hemoglobin A1c  Lab Results   Component Value Date/Time    Glucose 171 (H) 05/02/2021 08:47 PM       No results found for: HBA1C, HGBE8, GRA8JOCC     Lipid Panel  No results found for: CHOL, CHOLX, CHLST, CHOLV, 233461, HDL, HDLP, LDL, LDLC, DLDLP, TGLX, TRIGL, TRIGP, CHHD, CHHDX     Discharge Diagnosis: Please refer to physician's discharge summary. Discharge Plan:     The patient Regina Saldivar exhibits the ability to control behavior in a less restrictive environment. Patient's level of functioning is improving. No assaultive/destructive behavior has been observed for the past 24 hours. No suicidal/homicidal threat or behavior has been observed for the past 24 hours. There is no evidence of serious medication side effects. Patient has not been in physical or protective restraints for at least the past 24 hours. If weapons involved, how are they secured? None reported    Is patient aware of and in agreement with discharge plan? Yes    Arrangements for medication:  Prescriptions given to patient. Copy of discharge instructions to provider?:  [employer] [facility]    Arrangements for transportation home:  Family    Keep all follow up appointments as scheduled, continue to take prescribed medications per physician instructions. Mental health crisis number:  134 or your local mental health crisis line number at [phone]. Discharge Medication List and Instructions:   Current Discharge Medication List      START taking these medications    Details   topiramate (TOPAMAX) 25 mg tablet Take 1 Tab by mouth two (2) times daily (with meals).  Indications: cocaine use disorder  Qty: 60 Tab, Refills: 1      traZODone (DESYREL) 50 mg tablet Take 1 Tab by mouth nightly as needed for Sleep (For insomnia). Indications: insomnia associated with depression  Qty: 30 Tab, Refills: 1      hydrOXYzine HCL (ATARAX) 50 mg tablet Take 1 Tab by mouth two (2) times daily as needed for Anxiety for up to 60 days. Indications: anxious  Qty: 60 Tab, Refills: 1             Unresulted Labs (24h ago, onward)    None        To obtain results of studies pending at discharge, please contact N/A. Follow-up Information     Follow up With Specialties Details Why [id number]   Please call the triage specialist Zach Flanagan for a mental health intake assessment at [phone] ext. [phone] [address]  Hours: Monday-Friday 8:30AM- 12:00PM & 1:00PM-4:30PM   PHONE: [phone]: [phone]           Advanced Directive:   Does the patient have an appointed surrogate decision maker? Unknown   Does the patient have a Medical Advance Directive? Unknown   Does the patient have a Psychiatric Advance Directive? Unknown   If the patient does not have a surrogate or Medical Advance Directive AND Psychiatric Advance Directive, the patient was offered information on these advance directives. Unknown     Patient Instructions: Please continue all medications until otherwise directed by physician. Tobacco Cessation Discharge Plan:   Is the patient a smoker and needs referral for smoking cessation? No  Patient referred to the following for smoking cessation with an appointment? No   Patient was offered medication to assist with smoking cessation at discharge? No  Was education for smoking cessation added to the discharge instructions? No     Alcohol/Substance Abuse Discharge Plan:   Does the patient have a history of substance/alcohol abuse and requires a referral for treatment? Yes  Patient referred to the following for substance/alcohol abuse treatment with an appointment? Yes  Patient was offered medication to assist with alcohol cessation at discharge?  No  Was education for substance/alcohol abuse added to discharge instructions? Yes     Patient discharged to Home; provided to the patient/caregiver either in hard copy or electronically. Continuing care paperwork was faxed to community mental health providers.

## 2021-05-06 NOTE — GROUP NOTE
DEBORAH  GROUP DOCUMENTATION INDIVIDUAL Group Therapy Note Date: 5/6/2021 Group Start Time: 1000 Group End Time: 1100 Group Topic: Topic Group [address] ACUTE BEHAV [facility] [place] [address] [facility] GROUP DOCUMENTATION GROUP Group Therapy Note Attendees: 5 Attendance: Did not attend Patient's Goal:Gladis Mayorga

## 2021-05-06 NOTE — BH NOTES
GROUP THERAPY PROGRESS NOTE    Patient is participating in Process group. Group time: 35 minutes    Personal goal for participation: To try new coping skills    Goal orientation: Personal    Group therapy participation: passive    Therapeutic interventions reviewed and discussed: Group participating in a guided imagery activity designed for depression. Group members were asked to try to relax and follow along with the CD. Discussion of how each person felt during and after exercise. Group members also discussed alternative coping skills that they use currently and those that have not worked in the past.    Impression of participation: [facility] was present for the first part of group. She sat quietly working on her safety plan and then got up and excused herself to use the restroom. She did not return before group ended.     Higinio Alvarez LPC LSATP CSAC

## 2021-05-06 NOTE — BH NOTES
GROUP THERAPY PROGRESS NOTE    Patient did not participate in Substance Abuse/Coping Skills group.      Janelle Bach LPC LSATP CSAC

## 2021-05-06 NOTE — SUICIDE SAFETY PLAN
SAFETY PLAN    A suicide Safety Plan is a document that supports someone when they are having thoughts of suicide. Warning Signs that indicate a suicidal crisis may be developing: What (situations, thoughts, feelings, body sensations, behaviors, etc.) do you experience that lets you know you are beginning to think about suicide? 1. Talking to my self  2. Walking in circles  3. Using illicit substances     Internal Coping Strategies:  What things can I do (relaxation techniques, hobbies, physical activities, etc.) to take my mind off my problems without contacting another person? 1. Television  2. Sleeping  3. Jogging    People and social settings that provide distraction: Who can I call or where can I go to distract me? 1. Name: Dale Muñoz - sister  2. Name: Yvon Chaudhary whom I can ask for help: Who can I call when I need help - for example, friends, family, clergy, someone else? 1. Name: Kong Mar or [address] [place] [address] I can contact during a crisis: Who can I call for help - for example, my doctor, my psychiatrist, my psychologist, a mental health provider, a suicide hotline? [address] [place] [address]   Address: [address] [place] [address]  Phone: [phone]  Fax: [phone]  Crisis number:  [phone]     5. Suicide Prevention Lifeline: 6-869-952-TALK (2101)    4. [address] [place] [address] Emergency Services -  for example, [facility] suicide hotline, [facility] Hotline: [address] [place] [address]   Address: [address] [place] [address], [address] [place] [address] Road  Phone: [phone]  Fax: [phone]  Crisis number:  [phone]     Making the environment safe: How can I make my environment (house/apartment/living space) safer? For example, can I remove guns, medications, and other items? 1. Take medications as prescribed.

## 2021-05-06 NOTE — BH NOTES
PSYCHIATRIC PROGRESS NOTE         Patient Name  Andra Sam   Date of Birth 1990   [facility] [id number]   Medical Record Number  [id number]      Age  28 y.o. PCP None   Admit date:  5/2/2021    Room Number  713/11  @ [facility]   Date of Service  5/5/2021         E & M PROGRESS NOTE:         HISTORY       CC:  \"suicidal ideation\"  HISTORY OF PRESENT ILLNESS/INTERVAL HISTORY:  (reviewed/updated 5/5/2021). per initial evaluation: The patient, Andra Sam, is a 28 y.o. BLACK/ female with a past psychiatric history significant for cocaine and methamphetamine use disorder, who presents at this time with complaints of (and/or evidence of) the following emotional symptoms: depression and suicidal thoughts/threats. Additional symptomatology include escalation of substance abuse. The above symptoms have been present for 2+ weeks. These symptoms are of moderate to high severity. These symptoms are constant in nature. The patient's condition has been precipitated by psychosocial stressors. Patient's condition made worse by continued illicit drug use. UDS: negative; BAL=0.      The patient is a fair historian. The patient corroborates the above narrative. The patient contracts for safety on the unit and gives consent for the team to contact collateral. The patient is amenable to initiating treatment while on the unit. The patient reports that she has been feeling fairly depressed and using cocaine and heroin. She felt overwhelmed and stated she was considering injecting cocaine into her vein to end her own life. She is ambivalent about substance abuse rehab, but agrees to detox services. Patient requests STI testing.    5/05 - no acute overnight events. Patient slept 11 hours. She got labwork drawn, slept much of the day and overnight. Patient isolative, odd, medication compliant, no withdrawal symptoms noted.  Patient denies active thoughts of self harm; she notes that she has been sober for 4 days and is looking forward to leaving the hospital. Patient offered and declined rehab services. SIDE EFFECTS: (reviewed/updated 5/5/2021)  None reported or admitted to. ALLERGIES:(reviewed/updated 5/5/2021)  Allergies   Allergen Reactions    Seafood Swelling      MEDICATIONS PRIOR TO ADMISSION:(reviewed/updated 5/5/2021)  No medications prior to admission. PAST MEDICAL HISTORY: Past medical history from the initial psychiatric evaluation has been reviewed (reviewed/updated 5/5/2021) with no additional updates (I asked patient and no additional past medical history provided). History reviewed. No pertinent past medical history. History reviewed. No pertinent surgical history. SOCIAL HISTORY: Social history from the initial psychiatric evaluation has been reviewed (reviewed/updated 5/5/2021) with no additional updates (I asked patient and no additional social history provided).  Social History     Socioeconomic History    Marital status: SINGLE     Spouse name: Not on file    Number of children: Not on file    Years of education: Not on file    Highest education level: Not on file   Occupational History    Not on file   Social Needs    Financial resource strain: Not on file    Food insecurity     Worry: Not on file     Inability: Not on file    Transportation needs     Medical: Not on file     Non-medical: Not on file   Tobacco Use    Smoking status: Current Every Day Smoker    Smokeless tobacco: Never Used   Substance and Sexual Activity    Alcohol use: Yes     Comment: daily    Drug use: Yes     Types: Cocaine, Heroin    Sexual activity: Not Currently   Lifestyle    Physical activity     Days per week: Not on file     Minutes per session: Not on file    Stress: Not on file   Relationships    Social connections     Talks on phone: Not on file     Gets together: Not on file     Attends Rastafarian service: Not on file     Active member of club or organization: Not on file     Attends meetings of clubs or organizations: Not on file     Relationship status: Not on file    Intimate partner violence     Fear of current or ex partner: Not on file     Emotionally abused: Not on file     Physically abused: Not on file     Forced sexual activity: Not on file   Other Topics Concern    Not on file   Social History Narrative    Not on file      FAMILY HISTORY: Family history from the initial psychiatric evaluation has been reviewed (reviewed/updated 5/5/2021) with no additional updates (I asked patient and no additional family history provided). History reviewed. No pertinent family history. REVIEW OF SYSTEMS: (reviewed/updated 5/5/2021)  Appetite:no change from normal   Sleep: poor with DIMS (difficulty initiating & maintaining sleep)   All other Review of Systems: Negative except per HPI         [facility] (MSE):    MSE FINDINGS ARE WITHIN NORMAL LIMITS (WNL) UNLESS OTHERWISE STATED BELOW. ( ALL OF THE BELOW CATEGORIES OF THE MSE HAVE BEEN REVIEWED (reviewed 5/5/2021) AND UPDATED AS DEEMED APPROPRIATE )  General Presentation age appropriate, guarded   Orientation oriented to time, place and person   Vital Signs  See below (reviewed 5/5/2021); Vital Signs (BP, Pulse, & Temp) are within normal limits if not listed below.    Gait and Station Stable/steady, no ataxia   Musculoskeletal System No extrapyramidal symptoms (EPS); no abnormal muscular movements or Tardive Dyskinesia (TD); muscle strength and tone are within normal limits   Language No aphasia or dysarthria   Speech:  normal volume and non-pressured   Thought Processes coherent; normal rate of thoughts; fair abstract reasoning/computation   Thought Associations tangential   Thought Content preoccupations   Suicidal Ideations contracts for safety   Homicidal Ideations contracts for safety   Mood:  anxious    Affect:  odd demeanor   Memory recent  fair   Memory remote:  intact Concentration/Attention:  intact   Fund of Knowledge average   Insight:  limited   Reliability fair   Judgment:  poor          VITALS:     Patient Vitals for the past 24 hrs:   Temp Pulse Resp BP SpO2   05/05/21 2027 98.6 °F (37 °C) 81 18 121/70 100 %   05/05/21 0800 97.4 °F (36.3 °C) 75 18 121/70 100 %     Wt Readings from Last 3 Encounters:   05/04/21 50.3 kg (111 lb)     Temp Readings from Last 3 Encounters:   05/05/21 98.6 °F (37 °C)     BP Readings from Last 3 Encounters:   05/05/21 121/70     Pulse Readings from Last 3 Encounters:   05/05/21 81            DATA     LABORATORY DATA:(reviewed/updated 5/5/2021)  Recent Results (from the past 24 hour(s))   HIV 1/2 AG/AB, 4TH GENERATION,W RFLX CONFIRM    Collection Time: 05/05/21  4:23 AM   Result Value Ref Range    HIV 1/2 Interpretation NONREACTIVE NR      HIV 1/2 result comment SEE NOTE     RPR    Collection Time: 05/05/21  4:23 AM   Result Value Ref Range    RPR NONREACTIVE NR     HEPATITIS PANEL, ACUTE    Collection Time: 05/05/21  4:23 AM   Result Value Ref Range    Hepatitis A, IgM NONREACTIVE NR      __          Hepatitis B surface Ag <0.10 Index    Hep B surface Ag Interp. Negative NEG      __          Hepatitis B core, IgM NONREACTIVE NR      __          Hepatitis C virus Ab >11.00 Index    Hep C virus Ab Interp. REACTIVE (A) NR      Hep C  virus Ab comment Method used is Siemens Advia Centaur       No results found for: VALF2, VALAC, VALP, VALPR, DS6, CRBAM, CRBAMP, CARB2, XCRBAM  No results found for: LITHM   RADIOLOGY REPORTS:(reviewed/updated 5/5/2021)  No results found.        MEDICATIONS     ALL MEDICATIONS:   Current Facility-Administered Medications   Medication Dose Route Frequency    topiramate (TOPAMAX) tablet 25 mg  25 mg Oral BID WITH MEALS    OLANZapine (ZyPREXA) tablet 5 mg  5 mg Oral Q6H PRN    haloperidol lactate (HALDOL) injection 5 mg  5 mg IntraMUSCular Q6H PRN    benztropine (COGENTIN) tablet 1 mg  1 mg Oral BID PRN    diphenhydrAMINE (BENADRYL) injection 50 mg  50 mg IntraMUSCular BID PRN    hydrOXYzine HCL (ATARAX) tablet 50 mg  50 mg Oral TID PRN    LORazepam (ATIVAN) injection 1 mg  1 mg IntraMUSCular Q4H PRN    traZODone (DESYREL) tablet 50 mg  50 mg Oral QHS PRN    acetaminophen (TYLENOL) tablet 650 mg  650 mg Oral Q4H PRN    magnesium hydroxide (MILK OF MAGNESIA) 400 mg/5 mL oral suspension 30 mL  30 mL Oral DAILY PRN    cloNIDine HCL (CATAPRES) tablet 0.1 mg  0.1 mg Oral Q4H PRN      SCHEDULED MEDICATIONS:   Current Facility-Administered Medications   Medication Dose Route Frequency    topiramate (TOPAMAX) tablet 25 mg  25 mg Oral BID WITH MEALS          ASSESSMENT & PLAN     DIAGNOSES REQUIRING ACTIVE TREATMENT AND MONITORING: (reviewed/updated 5/5/2021)  Patient Active Hospital Problem List:  Adjustment disorder with depressed mood    Assessment: suspect cocaine withdrawal dysphoria. 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 She is a good candidate for rehab services. Will start agent to control cravings, observe off substances. Plan:   - Observe off substances  - HIV, RPR, Hep A/B - unremarkable  - GC/Chyl WNL  - CONTINUE Topamax 25 mg BID for cocaine use disorder  - IGM therapy as tolerated  - Expand database / obtain collateral  - Dispo planning (home vs rehab)    In summary, Ling Newton, is a 28 y.o.  female who presents with a severe exacerbation of the principal diagnosis of Adjustment disorder with depressed mood    Patient's condition is improving. Patient requires continued inpatient hospitalization for further stabilization, safety monitoring and medication management. I will continue to coordinate the provision of individual, milieu, occupational, group, and substance abuse therapies to address target symptoms/diagnoses as deemed appropriate for the individual patient.   A coordinated, multidisplinary treatment team round was conducted with the patient (this team consists of the nurse, psychiatric unit pharmacist,  and writer). Complete current electronic health record for patient has been reviewed today including consultant notes, ancillary staff notes, nurses and psychiatric tech notes. Suicide risk assessment completed and patient deemed to be of low risk for suicide at this time. The following regarding medications was addressed during rounds with patient:   the risks and benefits of the proposed medication. The patient was given the opportunity to ask questions. Informed consent given to the use of the above medications. Will continue to adjust psychiatric and non-psychiatric medications (see above \"medication\" section and orders section for details) as deemed appropriate & based upon diagnoses and response to treatment. I will continue to order blood tests/labs and diagnostic tests as deemed appropriate and review results as they become available (see orders for details and above listed lab/test results). I will order psychiatric records from previous [facility] hospitals to further elucidate the nature of patient's psychopathology and review once available. I will gather additional collateral information from friends, family and o/p treatment team to further elucidate the nature of patient's psychopathology and baselline level of psychiatric functioning. I certify that this patient's inpatient psychiatric hospital services furnished since the previous certification were, and continue to be, required for treatment that could reasonably be expected to improve the patient's condition, or for diagnostic study, and that the patient continues to need, on a daily basis, active treatment furnished directly by or requiring the supervision of inpatient psychiatric facility personnel.  In addition, the hospital records show that services furnished were intensive treatment services, admission or related services, or equivalent services.     EXPECTED DISCHARGE DATE/DAY: 5/6/21     DISPOSITION: Home       Signed By:   Noemy Juarez MD  5/5/2021

## 2021-05-06 NOTE — PROGRESS NOTES
Problem: Suicide  Goal: *STG: Remains safe in hospital  Outcome: Progressing Towards Goal  Goal: *STG: Attends activities and groups  Outcome: Not Progressing Towards Goal  Goal: *STG:  Verbalizes alternative ways of dealing with maladaptive feelings/behaviors  Outcome: Progressing Towards Goal  Goal: *STG/LTG: Complies with medication therapy  Outcome: Progressing Towards Goal     Verbal shift change report given to [employer]  (oncoming nurse) by Levy Morgan (offgoing nurse). Report included the following information SBAR, Kardex, MAR, and Recent Results. Patient denies SI/HI/AVH. Patient denies anxiety and depression. Patient tolerated breakfast and ambulated back to her room. Patient resting in bed at this time. No further issues noted at this time.

## 2021-05-06 NOTE — DISCHARGE SUMMARY
PSYCHIATRIC DISCHARGE SUMMARY         IDENTIFICATION:    Patient Name  Andra Sam   Date of Birth 1990   [facility] [id number]   Medical Record Number  [id number]      Age  28 y.o. PCP None   Admit date:  5/2/2021    Discharge date: 5/6/2021   Room Number  684/13  @ [facility]   Date of Service  5/6/2021            TYPE OF DISCHARGE: REGULAR               CONDITION AT DISCHARGE: improved and fair       PROVISIONAL & DISCHARGE DIAGNOSES:    Problem List  Never Reviewed          Codes Class    * (Principal) Adjustment disorder with depressed mood ICD-10-CM: F43.21  ICD-9-CM: 309.0         Cocaine use disorder ([facility] [address] ICD-10-CM: F14.10  ICD-9-CM: 305.60         Heroin use disorder, mild ([facility] [address] ICD-10-CM: F11.10  ICD-9-CM: 305.50               Active Hospital Problems    *Adjustment disorder with depressed mood      Cocaine use disorder (HCC)      Heroin use disorder, mild (HCC)        DISCHARGE DIAGNOSIS:   Axis I:  SEE ABOVE  Axis II: SEE ABOVE  Axis III: SEE ABOVE  Axis IV:  lack of structure  Axis V:  20 on admission, 55 on discharge     CC & HISTORY OF PRESENT ILLNESS:  \"suicidal ideation\"    The patient, Nicole Cutler, is a 31 y.o.  BLACK/ female with a past psychiatric history significant for cocaine and methamphetamine use disorder, who presents at this time with complaints of (and/or evidence of) the following emotional symptoms: depression and suicidal thoughts/threats.  Additional symptomatology include escalation of substance abuse.  The above symptoms have been present for 2+ weeks. These symptoms are of moderate to high severity. These symptoms are constant in nature.  The patient's condition has been precipitated by psychosocial stressors.  Patient's condition made worse by continued illicit drug use. UDS: negative; BAL=0.      The patient is a fair historian. The patient corroborates the above narrative.  The patient contracts for safety on the unit and gives consent for the team to contact collateral. The patient is amenable to initiating treatment while on the unit. The patient reports that she has been feeling fairly depressed and using cocaine and heroin. She felt overwhelmed and stated she was considering injecting cocaine into her vein to end her own life. She is ambivalent about substance abuse rehab, but agrees to detox services. Patient requests STI testing.     5/05 - no acute overnight events. Patient slept 11 hours. She got labwork drawn, slept much of the day and overnight. Patient isolative, odd, medication compliant, no withdrawal symptoms noted. Patient denies active thoughts of self harm; she notes that she has been sober for 4 days and is looking forward to leaving the hospital. Patient offered and declined rehab services.        SOCIAL HISTORY:    Social History     Socioeconomic History    Marital status: SINGLE     Spouse name: Not on file    Number of children: Not on file    Years of education: Not on file    Highest education level: Not on file   Occupational History    Not on file   Social Needs    Financial resource strain: Not on file    Food insecurity     Worry: Not on file     Inability: Not on file    Transportation needs     Medical: Not on file     Non-medical: Not on file   Tobacco Use    Smoking status: Current Every Day Smoker    Smokeless tobacco: Never Used   Substance and Sexual Activity    Alcohol use: Yes     Comment: daily    Drug use: Yes     Types: Cocaine, Heroin    Sexual activity: Not Currently   Lifestyle    Physical activity     Days per week: Not on file     Minutes per session: Not on file    Stress: Not on file   Relationships    Social connections     Talks on phone: Not on file     Gets together: Not on file     Attends Hinduism service: Not on file     Active member of club or organization: Not on file     Attends meetings of clubs or organizations: Not on file     Relationship status: Not on file   Joy Dillon Intimate partner violence     Fear of current or ex partner: Not on file     Emotionally abused: Not on file     Physically abused: Not on file     Forced sexual activity: Not on file   Other Topics Concern    Not on file   Social History Narrative    Not on file      FAMILY HISTORY:   History reviewed. No pertinent family history. HOSPITALIZATION COURSE:    Danielito Senior was admitted to the inpatient psychiatric unit [facility] for acute psychiatric stabilization in regards to symptomatology as described in the HPI above. The differential diagnosis at time of admission included: MDD vs adjustment disorder. While on the unit Danielito Senior was involved in individual, group, occupational and milieu therapy. Psychiatric medications were adjusted during this hospitalization including Topamax. Danielito Senior demonstrated a slow, but progressive improvement in overall condition. Much of patient's initial presentation appeared to be related to situational stressors, effects of medication non-compliance, drugs of abuse, and psychological factors. Please see individual progress notes for more specific details regarding patient's hospitalization course. Patient with request for discharge today. There are no grounds to seek a TDO. Patient offered and declined rehab services. At time of discharge, Danielito Senior is without significant problems of depression, psychosis, or shawn. Patient free of suicidal and homicidal ideations (appears to be at a chronic, elevated risk of suicide or homicide due to ongoing substance abuse) but reports many positive predictive factors in terms of not attempting suicide or homicide. Overall presentation at time of discharge is most consistent with the diagnosis of adjustment disorder with depressed mood and cocaine use disorder. Patient has maximized benefit to be derived from acute inpatient psychiatric treatment.   All members of the treatment team concur with each other in regards to plans for discharge today. Patient and family are aware and in agreement with discharge and discharge plan. LABS AND IMAGAING:    Labs Reviewed   CBC WITH AUTOMATED DIFF - Abnormal; Notable for the following components:       Result Value    ABS. LYMPHOCYTES 3.8 (*)     All other components within normal limits   METABOLIC PANEL, COMPREHENSIVE - Abnormal; Notable for the following components:    Sodium 133 (*)     Glucose 171 (*)     Calcium 8.4 (*)     Bilirubin, total 0.1 (*)     Albumin 3.3 (*)     A-G Ratio 0.9 (*)     All other components within normal limits   SALICYLATE - Abnormal; Notable for the following components:    Salicylate level <3.5 (*)     All other components within normal limits   ACETAMINOPHEN - Abnormal; Notable for the following components:    Acetaminophen level <2 (*)     All other components within normal limits   URINALYSIS W/ REFLEX CULTURE - Abnormal; Notable for the following components:    Leukocyte Esterase SMALL (*)     Bacteria 1+ (*)     All other components within normal limits   CHRONIC HEPATITIS PANEL - Abnormal; Notable for the following components:    HCV Ab >11.0 (*)     All other components within normal limits   HEPATITIS PANEL, ACUTE - Abnormal; Notable for the following components:    Hep C virus Ab Interp.  REACTIVE (*)     All other components within normal limits   ETHYL ALCOHOL   DRUG SCREEN, URINE   HCG QL SERUM   COVID-19 WITH INFLUENZA A/B   HIV 1/2 AG/AB, 4TH GENERATION,W RFLX CONFIRM   RPR   CT/NG/T.VAGINALIS AMPLIFICATION   HCV COMMENT   HCG URINE, QL. - POC     No results found for: DS35, PHEN, PHENO, PHENT, DILF, DS39, PHENY, PTN, VALF2, VALAC, VALP, VALPR, DS6, CRBAM, CRBAMP, CARB2, XCRBAM  Admission on 05/02/2021, Discharged on 05/06/2021   Component Date Value Ref Range Status    WBC 05/02/2021 9.5  3.6 - 11.0 K/uL Final    RBC 05/02/2021 4.52  3.80 - 5.20 M/uL Final    HGB 05/02/2021 14.4  11.5 - 16.0 g/dL Final    HCT 05/02/2021 42.4  35.0 - 47.0 % Final    MCV 05/02/2021 93.8  80.0 - 99.0 FL Final    MCH 05/02/2021 31.9  26.0 - 34.0 PG Final    MCHC 05/02/2021 34.0  30.0 - 36.5 g/dL Final    RDW 05/02/2021 11.9  11.5 - 14.5 % Final    PLATELET 33/68/0789 814  150 - 400 K/uL Final    MPV 05/02/2021 9.9  8.9 - 12.9 FL Final    NRBC 05/02/2021 0.0  0  WBC Final    ABSOLUTE NRBC 05/02/2021 0.00  0.00 - 0.01 K/uL Final    NEUTROPHILS 05/02/2021 48  32 - 75 % Final    LYMPHOCYTES 05/02/2021 40  12 - 49 % Final    MONOCYTES 05/02/2021 7  5 - 13 % Final    EOSINOPHILS 05/02/2021 4  0 - 7 % Final    BASOPHILS 05/02/2021 1  0 - 1 % Final    IMMATURE GRANULOCYTES 05/02/2021 0  0.0 - 0.5 % Final    ABS. NEUTROPHILS 05/02/2021 4.5  1.8 - 8.0 K/UL Final    ABS. LYMPHOCYTES 05/02/2021 3.8* 0.8 - 3.5 K/UL Final    ABS. MONOCYTES 05/02/2021 0.7  0.0 - 1.0 K/UL Final    ABS. EOSINOPHILS 05/02/2021 0.4  0.0 - 0.4 K/UL Final    ABS. BASOPHILS 05/02/2021 0.1  0.0 - 0.1 K/UL Final    ABS. IMM. GRANS. 05/02/2021 0.0  0.00 - 0.04 K/UL Final    DF 05/02/2021 AUTOMATED    Final    Sodium 05/02/2021 133* 136 - 145 mmol/L Final    Potassium 05/02/2021 3.7  3.5 - 5.1 mmol/L Final    Chloride 05/02/2021 99  97 - 108 mmol/L Final    CO2 05/02/2021 28  21 - 32 mmol/L Final    Anion gap 05/02/2021 6  5 - 15 mmol/L Final    Glucose 05/02/2021 171* 65 - 100 mg/dL Final    BUN 05/02/2021 15  6 - 20 MG/DL Final    Creatinine 05/02/2021 0.80  0.55 - 1.02 MG/DL Final    BUN/Creatinine ratio 05/02/2021 19  12 - 20   Final    GFR est AA 05/02/2021 >60  >60 ml/min/1.73m2 Final    GFR est non-AA 05/02/2021 >60  >60 ml/min/1.73m2 Final    Calcium 05/02/2021 8.4* 8.5 - 10.1 MG/DL Final    Bilirubin, total 05/02/2021 0.1* 0.2 - 1.0 MG/DL Final    ALT (SGPT) 05/02/2021 33  12 - 78 U/L Final    AST (SGOT) 05/02/2021 16  15 - 37 U/L Final    Alk.  phosphatase 05/02/2021 87  45 - 117 U/L Final    Protein, total 05/02/2021 7.0  6.4 - 8.2 g/dL Final    Albumin 05/02/2021 3.3* 3.5 - 5.0 g/dL Final    Globulin 05/02/2021 3.7  2.0 - 4.0 g/dL Final    A-G Ratio 05/02/2021 0.9* 1.1 - 2.2   Final    ALCOHOL(ETHYL),SERUM 05/02/2021 <10  <10 MG/DL Final    Salicylate level 67/76/8847 <1.7* 2.8 - 20.0 MG/DL Final    Acetaminophen level 05/02/2021 <2* 10 - 30 ug/mL Final    AMPHETAMINES 05/02/2021 Negative  NEG   Final    BARBITURATES 05/02/2021 Negative  NEG   Final    BENZODIAZEPINES 05/02/2021 Negative  NEG   Final    COCAINE 05/02/2021 Negative  NEG   Final    METHADONE 05/02/2021 Negative  NEG   Final    OPIATES 05/02/2021 Negative  NEG   Final    PCP(PHENCYCLIDINE) 05/02/2021 Negative  NEG   Final    THC (TH-CANNABINOL) 05/02/2021 Negative  NEG   Final    Drug screen comment 05/02/2021 (NOTE)   Final    Color 05/02/2021 YELLOW/STRAW    Final    Appearance 05/02/2021 CLEAR  CLEAR   Final    Specific gravity 05/02/2021 1.005  1.003 - 1.030   Final    pH (UA) 05/02/2021 7.5  5.0 - 8.0   Final    Protein 05/02/2021 Negative  NEG mg/dL Final    Glucose 05/02/2021 Negative  NEG mg/dL Final    Ketone 05/02/2021 Negative  NEG mg/dL Final    Bilirubin 05/02/2021 Negative  NEG   Final    Blood 05/02/2021 Negative  NEG   Final    Urobilinogen 05/02/2021 1.0  0.2 - 1.0 EU/dL Final    Nitrites 05/02/2021 Negative  NEG   Final    Leukocyte Esterase 05/02/2021 SMALL* NEG   Final    WBC 05/02/2021 5-10  0 - 4 /hpf Final    RBC 05/02/2021 0-5  0 - 5 /hpf Final    Epithelial cells 05/02/2021 FEW  FEW /lpf Final    Bacteria 05/02/2021 1+* NEG /hpf Final    UA:UC IF INDICATED 05/02/2021 CULTURE NOT INDICATED BY UA RESULT  CNI   Final    HCG, Ql. 05/02/2021 Negative  NEG   Final    SARS-CoV-2 05/02/2021 Not detected  NOTD   Final    Influenza A by PCR 05/02/2021 Not detected  NOTD   Final    Influenza B by PCR 05/02/2021 Not detected  NOTD   Final    Ventricular Rate 05/02/2021 66  BPM Final    Atrial Rate 05/02/2021 66  BPM Final    P-R Interval 05/02/2021 184  ms Final    QRS Duration 05/02/2021 106  ms Final    Q-T Interval 05/02/2021 410  ms Final    QTC Calculation (Bezet) 05/02/2021 429  ms Final    Calculated P Axis 05/02/2021 67  degrees Final    Calculated R Axis 05/02/2021 70  degrees Final    Calculated T Axis 05/02/2021 44  degrees Final    Diagnosis 05/02/2021    Final                    Value:Normal sinus rhythm with sinus arrhythmia  Normal ECG  No previous ECGs available  Confirmed by Neel New M.D., Filomena Bella ([id number]) on 5/3/2021 7:51:32 AM      Pregnancy test,urine (POC) 05/02/2021 Negative  NEG   Final    HIV 1/2 Interpretation 05/05/2021 NONREACTIVE  NR   Final    HIV 1/2 result comment 05/05/2021 SEE NOTE    Final    RPR 05/05/2021 NONREACTIVE  NR   Final    Hep B surface Ag screen 05/05/2021 Negative  Negative   Final    Hepatitis Be Antigen 05/05/2021 Negative  Negative   Final    Hep B Core Ab, IgM 05/05/2021 Negative  Negative   Final    Hep B Core Ab, total 05/05/2021 Negative  Negative   Final    Hepatitis Be Antibody 05/05/2021 Negative  Negative   Final    Anti-HBs 05/05/2021 Reactive  Index Value Final    HCV Ab 05/05/2021 >11.0* 0.0 - 0.9 s/co ratio Final    Hepatitis A, IgM 05/05/2021 NONREACTIVE  NR   Final    __ 05/05/2021        Final    Hepatitis B surface Ag 05/05/2021 <0.10  Index Final    Hep B surface Ag Interp. 05/05/2021 Negative  NEG   Final    __ 05/05/2021        Final    Hepatitis B core, IgM 05/05/2021 NONREACTIVE  NR   Final    __ 05/05/2021        Final    Hepatitis C virus Ab 05/05/2021 >11.00  Index Final    Hep C virus Ab Interp. 05/05/2021 REACTIVE* NR   Final    Hep C  virus Ab comment 05/05/2021 Method used is [facility]    Final    COMMENT 05/05/2021 Comment    Final     No results found. DISPOSITION:    Home. Patient to f/u with drug/etoh rehabilitation and psychiatric appointments.  Patient is to f/u with internist as directed. FOLLOW-UP CARE:    Activity as tolerated  Regular diet  Wound Care: none needed. Follow-up Information     Follow up With Specialties Details Why [address]   Please call the triage specialist Marissa Cordero for a mental health intake assessment at [phone] ext. [phone] [id number]  Hours: Monday-Friday 8:30AM- 12:00PM & 1:00PM-4:30PM   PHONE: [phone]   FAX: [phone]                    PROGNOSIS:   Poor ---- based on nature of patient's pathology/ies and treatment compliance issues. Prognosis is greatly dependent upon patient's ability to remain sober and to follow up with scheduled appointments as well as to comply with psychiatric medications as prescribed. DISCHARGE MEDICATIONS:   Informed consent given for the use of following psychotropic medications:  Discharge Medication List as of 5/6/2021 11:45 AM      START taking these medications    Details   topiramate (TOPAMAX) 25 mg tablet Take 1 Tab by mouth two (2) times daily (with meals). Indications: cocaine use disorder, Print, Disp-60 Tab, R-1      traZODone (DESYREL) 50 mg tablet Take 1 Tab by mouth nightly as needed for Sleep (For insomnia). Indications: insomnia associated with depression, Print, Disp-30 Tab, R-1      hydrOXYzine HCL (ATARAX) 50 mg tablet Take 1 Tab by mouth two (2) times daily as needed for Anxiety for up to 60 days. Indications: anxious, Print, Disp-60 Tab, R-1                    A coordinated, multidisplinary treatment team round was conducted with Mic Sánchez---this is done daily here at [facility]. This team consists of the nurse, psychiatric unit pharmacist,  and writer. I have spent greater than 35 minutes on discharge work.     Signed:  Amy Rao MD  5/6/2021

## 2021-05-06 NOTE — PROGRESS NOTES
Problem: Patient Education: Go to Patient Education Activity  Goal: Patient/Family Education  Outcome: Progressing Towards Goal     Problem: Suicide  Goal: *STG: Remains safe in hospital  Outcome: Progressing Towards Goal  Goal: *STG: Seeks staff when feelings of self harm or harm towards others arise  Outcome: Progressing Towards Goal  Goal: *STG: Attends activities and groups  Outcome: Progressing Towards Goal  Goal: *STG:  Verbalizes alternative ways of dealing with maladaptive feelings/behaviors  Outcome: Progressing Towards Goal  Goal: *STG/LTG: Complies with medication therapy  Outcome: Progressing Towards Goal  Goal: *STG/LTG: No longer expresses self destructive or suicidal thoughts  Outcome: Progressing Towards Goal  Goal: *LTG:  Identifies available community resources  Outcome: Progressing Towards Goal  Goal: *LTG:  Develops proactive suicide prevention plan  Outcome: Progressing Towards Goal  Goal: Interventions  Outcome: Progressing Towards Goal

## 2021-05-06 NOTE — PROGRESS NOTES
Pharmacist Discharge Medication Reconciliation    Discharging Provider: Dr. Rahul Rodarte PMH: History reviewed. No pertinent past medical history. Chief Complaint for this Admission:   Chief Complaint   Patient presents with    Suicidal     Allergies: Seafood    Discharge Medications:   Current Discharge Medication List        START taking these medications    Details   topiramate (TOPAMAX) 25 mg tablet Take 1 Tab by mouth two (2) times daily (with meals). Indications: cocaine use disorder  Qty: 60 Tab, Refills: 1      traZODone (DESYREL) 50 mg tablet Take 1 Tab by mouth nightly as needed for Sleep (For insomnia). Indications: insomnia associated with depression  Qty: 30 Tab, Refills: 1      hydrOXYzine HCL (ATARAX) 50 mg tablet Take 1 Tab by mouth two (2) times daily as needed for Anxiety for up to 60 days.  Indications: anxious  Qty: 60 Tab, Refills: 1             The patient's chart, MAR and AVS were reviewed by Don Jewell, PRABHUD, BCPS

## 2021-05-06 NOTE — DISCHARGE INSTRUCTIONS
Patient Education   If I feel I am at risk of hurting myself or others, I will call the crisis office and speak with a crisis worker who will assist me during my crisis. [address]  [phone]  [address] [address] [phone]  [address]-   [address] Crisis-  [address] [place]  [phone]    DISCHARGE SUMMARY    NAME:Mary Beecher Peabody  : 1990  MRN: [id number]    The patient Nasra Pérez exhibits the ability to control behavior in a less restrictive environment. Patient's level of functioning is improving. No assaultive/destructive behavior has been observed for the past 24 hours. No suicidal/homicidal threat or behavior has been observed for the past 24 hours. There is no evidence of serious medication side effects. Patient has not been in physical or protective restraints for at least the past 24 hours. If weapons involved, how are they secured? None reported    Is patient aware of and in agreement with discharge plan? Yes    Arrangements for medication:  Prescriptions given to patient. Copy of discharge instructions to provider?:  [employer] [facility]    Arrangements for transportation home:  Family    Keep all follow up appointments as scheduled, continue to take prescribed medications per physician instructions. Mental health crisis number:  690 or your local mental health crisis line number at [phone]. Mental Health Emergency WARM LINE      3-435-551-MHAV ([phone])      M-F: 9am to 9pm      Sat & Sun: 5pm - 9pm  National suicide prevention lines:                             8-999-YOQWTPV ([phone])       6-974-739-TALK ([phone])    Crisis Text Line:  Text HOME to 2047       Adjustment Disorder: Care Instructions  Your Care Instructions     Adjustment disorder means that you have emotional or behavioral problems because of stress.  But your response to the stress is far more severe than a normal response. It is severe enough to affect your work or social life and may cause depression and physical pains and problems. Events that may cause this response can include a divorce, money problems, or starting school or a new job. It might be anything that causes some stress. This disorder is most often a short-term problem. It happens within 3 months of the stressful event or change. If the response lasts longer than 6 months after the event ends, you may have a more serious disorder. Follow-up care is a key part of your treatment and safety. Be sure to make and go to all appointments, and call your doctor if you are having problems. It's also a good idea to know your test results and keep a list of the medicines you take. How can you care for yourself at home? · Go to all counseling sessions. Do not skip any because you are feeling better. · If your doctor prescribed medicines, take them exactly as prescribed. Call your doctor if you think you are having a problem with your medicine. You will get more details on the specific medicines your doctor prescribes. · Discuss the causes of your stress with a good friend or family member. Or you can join a support group for people with similar problems. Talking to others sometimes relieves stress. · Get at least 30 minutes of exercise on most days of the week. Walking is a good choice. You also may want to do other activities, such as running, swimming, cycling, or playing tennis or team sports. Relaxation techniques  Do relaxation exercises 10 to 20 minutes a day. You can play soothing, relaxing music while you do them, if you wish. · Tell others in your house that you are going to do your relaxation exercises. Ask them not to disturb you. · Find a comfortable, quiet place. · Lie down on your back, or sit with your back straight. · Focus on your breathing. Make it slow and steady. · Breathe in through your nose.  Breathe out through either your nose or mouth. · Breathe deeply, filling up the area between your navel and your rib cage. Breathe so that your belly goes up and down. · Do not hold your breath. · Breathe like this for 5 to 10 minutes. Notice the feeling of calmness throughout your whole body. As you continue to breathe slowly and deeply, relax by doing these next steps for another 5 to 10 minutes:  · Tighten and relax each muscle group in your body. Start at your toes, and work your way up to your head. · Imagine your muscle groups relaxing and getting heavy. · Empty your mind of all thoughts. · Let yourself relax more and more deeply. · Be aware of the state of calmness that surrounds you. · When your relaxation time is over, you can bring yourself back to alertness by moving your fingers and toes. Then move your hands and feet. And then move your entire body. Sometimes people fall asleep during relaxation. But they most often wake up soon. · Always give yourself time to return to full alertness before you drive a car. Wait to do anything that might cause an accident if you are not fully alert. Never play a relaxation tape while you drive a car. When should you call for help? Call 911 anytime you think you may need emergency care. For example, call if:    · You feel you cannot stop from hurting yourself or someone else. Keep the numbers for these national suicide hotlines: 4-335-238-TALK (8-689.978.8506) and 1-748-UGNMMKC (5-517.222.2386). If you or someone you know talks about suicide or feeling hopeless, get help right away. Watch closely for changes in your health, and be sure to contact your doctor if:    · You have new anxiety, or your anxiety gets worse.     · You have been feeling sad, depressed, or hopeless or have lost interest in things that you usually enjoy.     · You do not get better as expected. Where can you learn more?   Go to http://www.gray.com/  Enter R087 in the search box to learn more about \"Adjustment Disorder: Care Instructions. \"  Current as of: August 31, 2020               Content Version: 12.8  © 2006-2021 [employer]. Care instructions adapted under license by [employer] (which disclaims liability or warranty for this information). If you have questions about a medical condition or this instruction, always ask your healthcare professional. [place] [address] any warranty or liability for your use of this information.

## 2021-05-08 LAB
C TRACH RRNA SPEC QL NAA+PROBE: NEGATIVE
N GONORRHOEA RRNA SPEC QL NAA+PROBE: NEGATIVE
SPECIMEN SOURCE: ABNORMAL
T VAGINALIS RRNA VAG QL NAA+PROBE: POSITIVE

## 2022-03-18 PROBLEM — F14.10 COCAINE USE DISORDER (HCC): Status: ACTIVE | Noted: 2021-05-04

## 2022-03-19 PROBLEM — F11.10: Status: ACTIVE | Noted: 2021-05-04

## 2022-03-19 PROBLEM — F43.21 ADJUSTMENT DISORDER WITH DEPRESSED MOOD: Status: ACTIVE | Noted: 2021-05-04

## 2023-05-15 RX ORDER — TOPIRAMATE 25 MG/1
25 TABLET ORAL 2 TIMES DAILY WITH MEALS
COMMUNITY
Start: 2021-05-06

## 2023-05-15 RX ORDER — TRAZODONE HYDROCHLORIDE 50 MG/1
50 TABLET ORAL
COMMUNITY
Start: 2021-05-06

## 2025-03-16 NOTE — PROGRESS NOTES
Problem: Discharge Planning  Goal: *Discharge to safe environment  Outcome: Progressing Towards Goal  Note: Patient identifies home as a safe environment. Patient will return home upon discharge. Goal: *Knowledge of medication management  Outcome: Progressing Towards Goal  Note: Patient verbalizes understanding of medication regimen. Patient is taking medications as prescribed. Goal: *Knowledge of discharge instructions  Outcome: Progressing Towards Goal  Note:     Patient verbalizes understanding of goals for treatment and safe discharge. Admission Reconciliation is Completed  Discharge Reconciliation is Not Complete Admission Reconciliation is Completed  Discharge Reconciliation is Completed